# Patient Record
Sex: MALE | Race: AMERICAN INDIAN OR ALASKA NATIVE | URBAN - NONMETROPOLITAN AREA
[De-identification: names, ages, dates, MRNs, and addresses within clinical notes are randomized per-mention and may not be internally consistent; named-entity substitution may affect disease eponyms.]

---
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
## 2019-01-02 ENCOUNTER — HOSPITAL ENCOUNTER (OUTPATIENT)
Dept: MRI IMAGING | Facility: OTHER | Age: 19
Discharge: HOME | End: 2019-01-02
Attending: NURSE PRACTITIONER | Admitting: FAMILY MEDICINE
Payer: COMMERCIAL

## 2019-01-02 DIAGNOSIS — M25.362 INSTABILITY OF KNEE JOINT, LEFT: ICD-10-CM

## 2019-01-02 DIAGNOSIS — M25.40 TRAUMATIC JOINT EFFUSION: ICD-10-CM

## 2019-01-02 PROCEDURE — 73721 MRI JNT OF LWR EXTRE W/O DYE: CPT | Mod: LT

## 2019-01-04 ENCOUNTER — TRANSFERRED RECORDS (OUTPATIENT)
Dept: HEALTH INFORMATION MANAGEMENT | Facility: CLINIC | Age: 19
End: 2019-01-04

## 2019-01-04 ENCOUNTER — MEDICAL CORRESPONDENCE (OUTPATIENT)
Dept: HEALTH INFORMATION MANAGEMENT | Facility: CLINIC | Age: 19
End: 2019-01-04

## 2019-01-10 ENCOUNTER — OFFICE VISIT (OUTPATIENT)
Dept: ORTHOPEDICS | Facility: CLINIC | Age: 19
End: 2019-01-10
Payer: COMMERCIAL

## 2019-01-10 VITALS
DIASTOLIC BLOOD PRESSURE: 74 MMHG | BODY MASS INDEX: 27.24 KG/M2 | HEIGHT: 71 IN | WEIGHT: 194.6 LBS | SYSTOLIC BLOOD PRESSURE: 137 MMHG | HEART RATE: 64 BPM | OXYGEN SATURATION: 98 %

## 2019-01-10 DIAGNOSIS — G89.29 CHRONIC PAIN OF LEFT KNEE: Primary | ICD-10-CM

## 2019-01-10 DIAGNOSIS — M25.562 CHRONIC PAIN OF LEFT KNEE: Primary | ICD-10-CM

## 2019-01-10 PROCEDURE — 99214 OFFICE O/P EST MOD 30 MIN: CPT | Mod: 24 | Performed by: ORTHOPAEDIC SURGERY

## 2019-01-10 ASSESSMENT — MIFFLIN-ST. JEOR: SCORE: 1919.83

## 2019-01-10 ASSESSMENT — PAIN SCALES - GENERAL: PAINLEVEL: NO PAIN (0)

## 2019-01-10 NOTE — PROGRESS NOTES
Patient seen and examined with the resident.     Assesment: Left ACL tear, Left lateral meniscus tear    Plan: offered btb acl reconstruction, lateral meniscus repair vs. Meniscectomy    I discussed with the patient the risks, benefits, complications and techniques of surgery as well as the natural history of ACL tears, meniscus tearsand the alternative treatment options.    The risks include, but are not limited to the risk of death and risk of a myocardial infarction, risk of bleeding and a risk of infection, risk of nerve damage and a risk of muscle damage, stiffness, instability, continued pain or worsening pain and retear of acl, stiffness, need for future surgery, continued pain, failure to improve.    The patient was provided an opportunity to ask questions and these were answered.    I agree with history, physical and imaging as well as the assessment and plan as detailed by Dr. Daniels.

## 2019-01-10 NOTE — PATIENT INSTRUCTIONS
[facility]  [address] [place] [id number]  Phone [phone]  Fax [phone]    SURGICAL INFORMATION & INSTRUCTIONS  Dr. Sunny Guillen  Name of Surgery: Left knee ACL reconstruction and meniscus repair    Date of Surgery:     If you need to reschedule/schedule your surgery, please contact Emily, our surgery scheduler at [place], at [phone].    Arrival Time:     Time of Surgery:      Please arrive early so that we can prepare you for surgery. If you arrive later than your scheduled arrival time, your surgery may be cancelled.  Please note that scheduled times may change, but you will always be notified if there is a change.      Location of Surgery:     ? [facility]  [address] [place] [address]  5th floor check-in  Phone [phone]  Fax [phone]  [email or web address]    Medical Leave Forms    ? Please fax any medical leave forms from your employer/school to [phone]. It can take up to 3 business days to complete the forms. We will fax them back to the number listed on the forms, if you would like a copy, please let us know and we will mail a copy to you. Do not bring with on day of surgery as the forms may get lost.    Prior to surgery    ? Call your insurance company  Ask if you need pre-approval for your surgery.  If pre-approval is needed, please call our surgery scheduler for assistance with the pre-approval process.   If you do not have insurance, please let us know.     ? Schedule an appointment with a Primary Care Provider for a Pre-Op Physical.  This should be done within 30 days of surgery  If you do not have a primary care provider, you may call [facility] [place] at [phone], for an appointment.  Please have your office note and any labs or tests faxed to the facility where you are having surgery. Please be sure to request a copy of your pre-op physical and bring it with you on  the day of surgery.      Tell your provider if you have any of the following:   - IF you have a pacemaker or an ICD (implanted cardiac defibrillator). If you do, please bring the ID card with you on the day of surgery  - IF you're a smoker. People who smoke have a higher risk of infection after surgery. Ask your provider how you can quit smoking.  - If you have diabetes, work with your provider to control your blood sugar. If its not well-controlled, we may need to delay surgery (or you may have problems healing afterward).  - If your surgeon asks you to see your dentist: You'll need to complete any dental work before surgery. Your dentist must send a letter to your surgery center saying it's ok to do the surgery.    ? Pre-Op Phone Call  You will receive a pre-op phone call 1-3 days before surgery to review your eating and drinking restrictions, review medications, and confirm surgery times.      ? 7-10 days BEFORE surgery  ? Stop taking aspirin, Plavix or aspirin products 10 days before surgery or as directed by your doctor.  ? Stop taking non-steroidal anti-inflammatory medications (naproxen/Aleve, ibuprofen/Advil/Motrin, celecoxib/Celebrex, meloxicam/Mobic) 1 week before surgery or as directed by your surgeon.  This will reduce the risk of bleeding during surgery.  ? Stop taking fish oil (Omega-3-fatty acid) 1 week before surgery.  ? It is OK to take acetaminophen (Tylenol) up until 2 hours prior to surgery.  ? Take prescription medications as directed by your doctor. Discuss which medications to take or hold prior to your surgery, with your primary care doctor.  ? If you have diabetes, ask your primary care doctor or endocrinologist how you should take your medication(s).    ? 24 hours BEFORE surgery  Stop drinking alcohol (beer, wine, liquor) at least 24-hours before and after your surgery.     ? Evening BEFORE surgery  - You may eat a normal meal the night before surgery, but eat nothing after midnight.      - Take a shower - to help wash away bacteria (germs) from your skin.  It s normal to have bacteria on your skin and skin protects us from these germs.  When you have surgery, we cut the skin.  Sometimes germs get into the cuts and cause infection (illness caused by germs).  By following the showering instructions and using the special soap, you will lower the number of germs on your skin.  This decreases your chance of an infection.    - Buy or get 8 ounces of antiseptic surgical soap called 4% CHG.  Common brands of this soap are Hibiclens and Exidine.    - You can find it this soap at your local pharmacy, clinic or retail store.  If you have trouble finding it, ask your pharmacist to help you find the right substitute.    - Do not shave within 12 inches of your incision (surgical cut) area for at least 3 days before surgery.  Shaving can make small cuts in the skin. This puts you at a higher risk of infection.    Items you will need for each shower:   - Newly washed towel  - 4 ounces of one of the recommended soaps    Follow these instructions, the evening before surgery  - Wash your hair and body with your regular shampoo and soap. Make sure you rinse the shampoo and soap from your hair and body.  - Using clean hands, apply about 2 ounces of soap gently on your skin from the neck to your toes. Use on your groin area last. Do not use this soap on your face or head. If you get any soap in your eyes, ears or mouth, rinse right away.  - Once the soap has been on your skin for at least 1 minute, rinse off completely and repeat washing with the surgical soap one more time.  - Rinse well and dry off using a clean towel.  If you feel any tingling, itching or other irritation, rinse right away. It is normal to feel some coolness on the skin after using the antiseptic soap. Your skin may feel a bit dry after the shower, but do not use any lotions, creams or moisturizers. Do not use hair spray or other products in your  hair.  - Dress in freshly washed clothes or pajamas. Use fresh pillowcases and sheets on your bed.    ? Day of Surgery  - You may drink clear liquids from midnight up to 2 hours before surgery or as directed by your surgeon.  Clear liquids include: Water, Pedialyte, Gatorade, apple juice and liquids you can read through. Please avoid liquids that are red or orange in color.   - Do NOT drink: milk, liquids that have pulp, orange juice, and lemonade or tomato juice.   - Do NOT chew gum, chew tobacco or suck on hard candy.    - Take another shower          Follow these instructions:      - Wash your hair and body with your regular shampoo and soap. Make sure you rinse the shampoo and soap from your hair and body.  - Using clean hands, apply about 2 ounces of soap gently on your skin from the neck to your toes. Use on your groin area last. Do not use this soap on your face or head. If you get any soap in your eyes, ears or mouth, rinse right away.  - Once the soap has been on your skin for at least 1 minute, rinse off completely and repeat washing with the surgical soap one more time.  - Rinse well and dry off using a clean towel.  If you feel any tingling, itching or other irritation, rinse right away. It is normal to feel some coolness on the skin after using the antiseptic soap. Your skin may feel a bit dry after the shower, but do not use any lotions, creams or moisturizers. Do not use hair spray or other products in your hair.  - Dress in freshly washed clothes.  - Do not wear deodorant, cologne, lotion, makeup, nail polish or jewelry to surgery.    ? If there is any change in your health PRIOR to your surgery, please contact your surgeon's office.  Such as a fever, body aches, fatigue, any flu-like symptoms, rash, or any new injury to related body part.    ? Arrange for someone to drive you home after surgery.    will need to be a responsible adult (18 years or older) that will provide transportation to and  from surgery and stay in the waiting room during your surgery. You may not drive yourself or take public transportation to and from surgery.    ? Arrange for someone to stay with you for 24 hours after you go home.   This person must be a responsible adult (18 years or older).    ? Bring these items to the hospital/surgery center:   ? Insurance card(s)  ? Money for parking and co-pays, if needed  ? A list of all the medications you take, including vitamins, minerals, herbs and over the counter medications.    ? A copy of your Advance Health Care Directive, if you have one.  This tells us what treatment(s) you would or would not want, and who would make health care decisions, if you could no longer speak for yourself.    ? A case for glasses, contact lenses, hearing aids or dentures.   ? Your inhaler or CPAP machine, if you use these at home    ? Leave extra cash, jewelry and other valuables at home.       ? Other information:   Sleep Apnea: Let your nurse know if you have a history of sleep apnea, only if you are having surgery at the [facility].    When you arrive for surgery  When you get to the surgery center/hospital, you will:  - Check in. If you are under age 18, you must be with a parent or legal guardian.  - Sign consent forms, if you haven t already. These forms state that you know the risks and benefits of surgery. When you sign the forms, you give us permission to do the surgery. Do not sign them unless you understand what will happen during and after your surgery. If you have any questions about your surgery, ask to speak with your doctor before you sign the forms. If you don t understand the answers, ask again.  - Receive a copy of the Patient s Bill of Rights. If you do not receive a copy, please ask for one.  - Change into hospital clothes. Your belongings will be placed in a bag. We will return them to you after surgery.  - Meet with the anesthesia provider. He or she will tell you  what kind of anesthesia (medicine) will be used to keep you comfortable during surgery.  - Remember: it s okay to remind doctors and nurses to wash their hands before touching you.  - In most cases, your surgeon will use a marker to write his or her initials on the surgery site. This ensures that the exact site is operated on.  - For safety reasons, we will ask you the same questions many times. For example, we may ask your name and birth date over and over again.  - Friends and family can stay with you until it s time for surgery. While you re in surgery, they will be in the waiting area. Please note that cell phones are not allowed in some patient care areas.  - If you have questions about what will happen in the operating room, talk to your care team.  - You will meet with an anesthesiologist, before your surgery.  He or she may reference types of anesthesia commonly used for surgeries:   o General:  This involves the use of an IV for injection of medication and anesthesia. You are put into a sleep and have a breathing tube to assist you with breathing.  o Sedation:  You are asleep, but not so deply that you need a breathing tube.   o Local or Regional: a nerve is injected to numb the surgical area.  o Spinal: you are numbed from the waist down with medicine injected into your back.  o Femoral Nerve Block:  Anesthesia injected into the groin of leg which you are having surgery on.      After surgery  We will move you to a recovery room, where we will watch you closely. If you have any pain or discomfort, tell your nurse. He or she will try to make you comfortable.    - If you are staying overnight, we will move you to your hospital room after you are awake.  - If you are going home, we will move you to another room. Friends and family may be able to join you. The length of time you spend in recovery depend on the type of medicine you received, your medical condition, the type of surgery you had, or your response  to the anesthesia given during your procedure.  - When you are discharged from the recovery room, the nurses will review instructions with you and your caregiver.  - Please wash your hands every time you touch the wound or change bandages or dressings.  - Do not submerge the wound in water for 2-3 weeks after surgery.  You may not use a bathtub or hot tub until the wound is closed. Any open area can be a source of incoming bacteria, so it is better to be on the safe side and avoid water submersion until your wound is fully healed.  Keep all dressings clean and dry.   - If you had surgery on your arm or leg, elevate it as much as possible to help reduce swelling.  - You may put ice on the surgical area for comfort, keeping ice on area for up to 20 minutes then off for 40 minutes.  You may do this the first few days after surgery to help reduce pain and swelling.   - Many surgical wounds will have small white strips of tape on them called steri-strips. These are to help support the incision and surrounding skin. Do not remove these. The edges will curl and fall off on their own, typically within 7-10 days with normal showering and hygiene.   - Drink at least 8-10 glasses of liquid each day to help your body heal.  - Keep your lungs clear by coughing and taking deep breaths every couple hours.  This is especially important the first 48 hours after surgery.    - Notify your doctor if you have any of the following:   o Fever of 101 F or higher  o Numbness and/or tingling  o Increased pain, redness or swelling  o Drainage from wound  o Prolonged or uncontrolled bleeding  o Difficulty with movement    ? Physical Therapy  You will start physical therapy 3-5 days after surgery. Please set up an appointment before your surgery with a physical therapist of your choice. Many places are booked out 1-2 weeks, so be sure to reach out as soon as you can. If you have questions or are needing a physical therapy order faxed, please  contact our clinic.     Follow-up Appointments, in Clinic  If you don't already have an appointment scheduled, please call to set up an appointment at [phone].      ? Post-Op appointments with provider (1 and 6 weeks post op)    Dealing with pain  A nurse will check your comfort level often during your stay. He or she will work with you to manage your pain.  It s expected that you will have pain after surgery.  Our goal is to reduce or minimize pain by:   - Remember pain is real. There are many ways to control pain. We can help you decide what works best for you.  - Ask for pain medicine when you need it.  Don t try to  tough it out --this can make you feel worse. Always take your medicine as ordered.  - Medicine doesn t work the same for everyone. If your medicine isn t working, tell your nurse. There may be other medicines or treatments we can try.  - Medication Refills.  If you need refills on your pain medication, please call the clinic as soon as possible.  It may take 72-business hours to obtain a refill.  Refills must be picked up at check-in 2, [facility] or mailed to a pharmacy of your choice.    - It is expected that you will wean off the pain medications in a timely manner.   - Constipation is a common side effect of pain medication, decreased activity and anesthesia from surgery.  Take a stool softener as prescribed by your doctor at the time of discharge.  You may also use over the counter medications as needed.  Be sure to increase your fiber (fruits and vegetables) and your water intake.      Please call the clinic at [phone], if you experience any problems or have questions.  If you are having an emergency, always call 221 or seek immediate evaluation at the Emergency Room.    Thank you for selecting the [facility] for your care!  ---------------------------------------------            Thanks for coming today.  [facility]  [address] [place] [address]    To schedule future appointments in Ortho Clinic, you may call [phone].    To schedule ordered imaging by your Provider: Call [facility] at [phone]    [employer] available online at:   [email or web address]    Please call if any further questions or concerns [phone] and ask for the Orthopedic Department. Clinic hours 8 am to 5 pm.    Return to clinic if symptoms worsen.

## 2019-01-10 NOTE — LETTER
1/10/2019         RE: Rene Domínguez  [id number] [address] [address]        Dear Colleague,    Thank you for referring your patient, Rene Domínguez, to the [facility] [[email or web address] Please see a copy of my visit note below.    CHIEF CONCERN: left knee injury    HISTORY:   The patient is a 18 year old male who is here for evaluation of a left knee injury. On 12/26/2018 he sustained a twisting-type injury to his left knee that was non contact. He had immediate pain and swelling, and presented to an ED. He had normal XRs, was placed in a knee immobilizer and given crutches. He was further evaluated at [facility] where an MRI was obtained that demonstrated ACL and meniscal injury, he was referred on to us for surgical evaluation.    He reports the pain and swelling has been improving, he's not taking pain medications. He has been WBAT without crutches. He has felt that his knee is unstable for him while walking without a brace. He hasn't been working with PT yet.    He did have a RIGHT MCL sprain in the past treated non op, he denies prior injuries or issues with his LEFT knee.    PAST MEDICAL HISTORY: (Reviewed with the patient and in the [facility] medical record)  1. No known medical problems    PAST SURGICAL HISTORY: (Reviewed with the patient and in the [facility] medical record)  1. Umbilical hernia repair  2. Orchectomy    MEDICATIONS: (Reviewed with the patient and in the [facility] medical record)    Notable medications include: none    ALLERGIES: (Reviewed with the patient and in the [facility] medical record)  1. amoxicillin    SOCIAL HISTORY: (Reviewed with the patient and in the medical record)  --Tobacco: nonsmoker  --Occupation: in school at [employer]  --Avocation/Sport: basketball, hunting, fishing, football    FAMILY HISTORY: (Reviewed with the patient and in the medical record)  -- No family history of bleeding, clotting, or difficulty with anesthesia    REVIEW OF SYSTEMS: (Reviewed  with the patient and on the health intake form)  -- A comprehensive 10 point review of systems was conducted and is negative except as noted in the HPI    EXAM:     General: Awake, Alert and Oriented, No acute Distress. Articulate and Interactive    There is no height or weight on file to calculate BMI.    Left Lower extremity :    Skin is Warm and Well perfused, no suggestion of infection    No previous incisions    Moderate effusion    Mild TTP on lateral joint line    ROM 5-95    Lachmans 2B, significant guarding with pivot shift testing    EHL/FHL/TA/GS 5/5    Sensation intact L3-S1    2+ Dorsalis Pedis Pulse     IMAGING:    Plain Radiographs: XR left knee with no osseous abnormalities    MRI left knee demonstrates an ACL tear and lateral meniscus bucket handle tear    ASSESSMENT:  1. Left knee ACL tear and lateral meniscus bucket handle tear    PLAN:  1. We discussed treatment options and our recommendation for surgery, and the patient was in agreement. We will plan on the following: left knee arthroscopy, ACL reconstruction with BTB autograft, and lateral meniscus repair.      Vee Daniels MD  Patient seen and evaluated with Dr. Guillen    Patient seen and examined with the resident.     Assesment: Left ACL tear, Left lateral meniscus tear    Plan: offered btb acl reconstruction, lateral meniscus repair vs. Meniscectomy    I discussed with the patient the risks, benefits, complications and techniques of surgery as well as the natural history of ACL tears, meniscus tearsand the alternative treatment options.    The risks include, but are not limited to the risk of death and risk of a myocardial infarction, risk of bleeding and a risk of infection, risk of nerve damage and a risk of muscle damage, stiffness, instability, continued pain or worsening pain and retear of acl, stiffness, need for future surgery, continued pain, failure to improve.    The patient was provided an opportunity to ask questions and  these were answered.    I agree with history, physical and imaging as well as the assessment and plan as detailed by Dr. Daniels.       Again, thank you for allowing me to participate in the care of your patient.        Sincerely,        Sunny Guillen MD

## 2019-01-10 NOTE — NURSING NOTE
"Rene Domínguez's chief complaint for this visit includes:  Chief Complaint   Patient presents with     Consult For     left knee     PCP: No Ref-Primary, Physician    Referring Provider:  Sunny León NP  [facility]  [address] [place] [address]    /74   Pulse 64   Ht 1.803 m (5' 11\")   Wt 88.3 kg (194 lb 9.6 oz)   SpO2 98%   BMI 27.14 kg/m    No Pain (0)     Do you need any medication refills at today's visit? no      "

## 2019-01-10 NOTE — PROGRESS NOTES
CHIEF CONCERN: left knee injury    HISTORY:   The patient is a 18 year old male who is here for evaluation of a left knee injury. On 12/26/2018 he sustained a twisting-type injury to his left knee that was non contact. He had immediate pain and swelling, and presented to an ED. He had normal XRs, was placed in a knee immobilizer and given crutches. He was further evaluated at [facility] where an MRI was obtained that demonstrated ACL and meniscal injury, he was referred on to us for surgical evaluation.    He reports the pain and swelling has been improving, he's not taking pain medications. He has been WBAT without crutches. He has felt that his knee is unstable for him while walking without a brace. He hasn't been working with PT yet.    He did have a RIGHT MCL sprain in the past treated non op, he denies prior injuries or issues with his LEFT knee.    PAST MEDICAL HISTORY: (Reviewed with the patient and in the [facility] medical record)  1. No known medical problems    PAST SURGICAL HISTORY: (Reviewed with the patient and in the [facility] medical record)  1. Umbilical hernia repair  2. Orchectomy    MEDICATIONS: (Reviewed with the patient and in the [facility] medical record)    Notable medications include: none    ALLERGIES: (Reviewed with the patient and in the [facility] medical record)  1. amoxicillin    SOCIAL HISTORY: (Reviewed with the patient and in the medical record)  --Tobacco: nonsmoker  --Occupation: in school at [employer]  --Avocation/Sport: basketball, hunting, fishing, football    FAMILY HISTORY: (Reviewed with the patient and in the medical record)  -- No family history of bleeding, clotting, or difficulty with anesthesia    REVIEW OF SYSTEMS: (Reviewed with the patient and on the health intake form)  -- A comprehensive 10 point review of systems was conducted and is negative except as noted in the HPI    EXAM:     General: Awake, Alert and Oriented, No acute Distress. Articulate and Interactive    There  is no height or weight on file to calculate BMI.    Left Lower extremity :    Skin is Warm and Well perfused, no suggestion of infection    No previous incisions    Moderate effusion    Mild TTP on lateral joint line    ROM 5-95    Lachmans 2B, significant guarding with pivot shift testing    EHL/FHL/TA/GS 5/5    Sensation intact L3-S1    2+ Dorsalis Pedis Pulse     IMAGING:    Plain Radiographs: XR left knee with no osseous abnormalities    MRI left knee demonstrates an ACL tear and lateral meniscus bucket handle tear    ASSESSMENT:  1. Left knee ACL tear and lateral meniscus bucket handle tear    PLAN:  1. We discussed treatment options and our recommendation for surgery, and the patient was in agreement. We will plan on the following: left knee arthroscopy, ACL reconstruction with BTB autograft, and lateral meniscus repair.      Vee Daniels MD  Patient seen and evaluated with Dr. Guillen

## 2019-01-14 ENCOUNTER — OFFICE VISIT (OUTPATIENT)
Dept: FAMILY MEDICINE | Facility: OTHER | Age: 19
End: 2019-01-14
Attending: FAMILY MEDICINE
Payer: COMMERCIAL

## 2019-01-14 ENCOUNTER — ANESTHESIA EVENT (OUTPATIENT)
Dept: SURGERY | Facility: AMBULATORY SURGERY CENTER | Age: 19
End: 2019-01-14

## 2019-01-14 VITALS — BODY MASS INDEX: 27.58 KG/M2 | HEIGHT: 71 IN | OXYGEN SATURATION: 98 % | WEIGHT: 197 LBS | HEART RATE: 53 BPM

## 2019-01-14 DIAGNOSIS — N44.00 TORSION OF TESTICLE: ICD-10-CM

## 2019-01-14 DIAGNOSIS — S83.252D BUCKET-HANDLE TEAR OF LATERAL MENISCUS OF LEFT KNEE AS CURRENT INJURY, SUBSEQUENT ENCOUNTER: ICD-10-CM

## 2019-01-14 DIAGNOSIS — S83.512D DISRUPTION OF ANTERIOR CRUCIATE LIGAMENT OF LEFT KNEE, SUBSEQUENT ENCOUNTER: ICD-10-CM

## 2019-01-14 DIAGNOSIS — Z01.818 PRE-OP EXAM: Primary | ICD-10-CM

## 2019-01-14 PROBLEM — S83.512A DISRUPTION OF ANTERIOR CRUCIATE LIGAMENT OF LEFT KNEE: Status: ACTIVE | Noted: 2019-01-14

## 2019-01-14 PROBLEM — S83.252A BUCKET-HANDLE TEAR OF LATERAL MENISCUS OF LEFT KNEE AS CURRENT INJURY: Status: ACTIVE | Noted: 2019-01-14

## 2019-01-14 LAB
ERYTHROCYTE [DISTWIDTH] IN BLOOD BY AUTOMATED COUNT: 12 % (ref 10–15)
HCT VFR BLD AUTO: 44.7 % (ref 40–53)
HGB BLD-MCNC: 15.7 G/DL (ref 13.3–17.7)
MCH RBC QN AUTO: 32.4 PG (ref 26.5–33)
MCHC RBC AUTO-ENTMCNC: 35.1 G/DL (ref 31.5–36.5)
MCV RBC AUTO: 92 FL (ref 78–100)
PLATELET # BLD AUTO: 191 10E9/L (ref 150–450)
RBC # BLD AUTO: 4.85 10E12/L (ref 4.4–5.9)
WBC # BLD AUTO: 6.5 10E9/L (ref 4–11)

## 2019-01-14 PROCEDURE — 85027 COMPLETE CBC AUTOMATED: CPT | Performed by: FAMILY MEDICINE

## 2019-01-14 PROCEDURE — 36415 COLL VENOUS BLD VENIPUNCTURE: CPT | Performed by: FAMILY MEDICINE

## 2019-01-14 PROCEDURE — 99214 OFFICE O/P EST MOD 30 MIN: CPT | Performed by: FAMILY MEDICINE

## 2019-01-14 ASSESSMENT — ANXIETY QUESTIONNAIRES
6. BECOMING EASILY ANNOYED OR IRRITABLE: NOT AT ALL
GAD7 TOTAL SCORE: 0
1. FEELING NERVOUS, ANXIOUS, OR ON EDGE: NOT AT ALL
5. BEING SO RESTLESS THAT IT IS HARD TO SIT STILL: NOT AT ALL
3. WORRYING TOO MUCH ABOUT DIFFERENT THINGS: NOT AT ALL
2. NOT BEING ABLE TO STOP OR CONTROL WORRYING: NOT AT ALL
7. FEELING AFRAID AS IF SOMETHING AWFUL MIGHT HAPPEN: NOT AT ALL

## 2019-01-14 ASSESSMENT — PAIN SCALES - GENERAL: PAINLEVEL: NO PAIN (0)

## 2019-01-14 ASSESSMENT — PATIENT HEALTH QUESTIONNAIRE - PHQ9
5. POOR APPETITE OR OVEREATING: NOT AT ALL
SUM OF ALL RESPONSES TO PHQ QUESTIONS 1-9: 0

## 2019-01-14 ASSESSMENT — LIFESTYLE VARIABLES: TOBACCO_USE: 0

## 2019-01-14 ASSESSMENT — MIFFLIN-ST. JEOR: SCORE: 1930.72

## 2019-01-14 NOTE — PROGRESS NOTES
"----------------- PREOPERATIVE EXAM ------------------  1/14/2019    SUBJECTIVE:  Rene Domínguez is a 18 year old male here for preoperative optimization.    I was asked to see Rene Domínguez by Dr. Guillen for preoperative     Date of Surgery: 01/15  Type of Surgery: left knee arthroscopy    Hospital:   [facility]    HPI: Patient arrives here for preop.  He is scheduled to undergo surgery to his left knee tomorrow January 15.  States December 26 he injured it while playing basketball.  He had come down twisted it.  Reports pain.  Difficulty moving.  Swelling.  He arrives here with a splint    Nursing Notes:   Gisel Bey LPN  1/14/2019  9:23 AM  Signed  Date of Surgery: 01/15/19   Type of Surgery: left knee meniscus  Surgeon:   Hospital:  [facility]   Fax: [phone]      Fever/Chills or other infectious symptoms in past month:no  >10lb weight loss in past two months: no  O2 SAT: 98    Health Care Directive/Code status:  no  Hx of blood transfusions:   no  Td up to date:  yes  History of VRE/MRSA:  no Date:     Preoperative Evaluation: Obstructive Sleep Apnea screening    S: Snore -  Do you snore loudly? (louder than talking or loud enough to be heard through closed doors)no  T: Tired - Do you often feel tired, fatigued, or sleepy during the daytime?no  O: Observed - Has anyone ever observed you stop breathing during your sleep?no  P: Pressure - Do you have or are you being treated for high blood pressure?no  B: BMI - BMI greater than 35kg/m2?no  A: Age - Age over 50 years old?no  N: Neck - Neck circumference greater than 40 cm?no  G: Gender - Gender: Male?yes    Total number of \"YES\" responses:  1    Scoring: Low risk of MARGARITA 0-2  At Risk of MARGARITA: >3 High Risk of MARGARITA: 5-8    Gisel Bey 1/14/2019 8:58 AM  Medication Reconciliation: complete.    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     Priority: Medium     Vitiligo 04/08/2010     Priority: Medium       Past Medical History:   Diagnosis Date     Concussion with loss of consciousness     x1 reports (\"being out for 3 min\".)     Other injury of unspecified body region     fx of forefoot     Other specified epidermal thickening     No Comments Provided     Undescended testicle     No Comments Provided     Vitiligo     has had derm consult       Past Surgical History:   Procedure Laterality Date     HERNIORRHAPHY UMBILICAL N/A 11/6/2018    Procedure: Umbilical Exploration, Excision of Urachal Cyst;  Surgeon: Rj Romero MD;  Location: [facility] OR     testicular surgery  2012    [facility]     wisdom teeth         Family History   Problem Relation Age of Onset     Hypertension Father         Hypertension     Hypertension Maternal Grandfather         Hypertension     Prostate Cancer Maternal Grandfather         Cancer-prostate     Thyroid Disease Maternal Grandmother         Thyroid Disease       Social History     Tobacco Use     Smoking status: Never Smoker     Smokeless tobacco: Never Used   Substance Use Topics     Alcohol use: No     Drug use: No     Comment: Drug use: No       No current outpatient medications on file.       Allergies:  Allergies   Allergen Reactions     Amoxicillin Rash and Hives     As a child       ROS:    Surgical:  patient denies previous complications from prior surgeries including but not limited to prolonged bleeding, anesthesia complications, dysrhythmias, surgical wound infections, or prolonged hospital stay.    Denies family hx of bleeding tendencies, anesthesia " "complications, or other problems with surgery.  Review of systems is positive only for knee and calf pain related to his injury.  For complete details see scanned reports       -------------------------------------------------------------    PHYSICAL EXAM:  Pulse 53   Ht 1.803 m (5' 11\")   Wt 89.4 kg (197 lb)   SpO2 98%   BMI 27.48 kg/m      EXAM:  General Appearance: Pleasant, alert, appropriate appearance for age. No acute distress  Head Exam: Normal. Normocephalic, atraumatic.  Eyes: PERRL, EOMI  Ears: Normal TM's bilaterally. Normal auditory canals and external ears.   OroPharynx: Normal buccal mucosa. Normal pharynx.  Neck: Supple, no masses or nodes, no lymphadenopathy.  No thyromegaly.  Lungs: Normal chest wall and respirations. Clear to auscultation, no wheezes or crackles.  Cardiovascular: Regular rate and rhythm. S1, S2, no murmurs.  Gastrointestinal: Soft, nontender, no abnormal masses or organomegaly. BS normal   Musculoskeletal:  Brace on left knee  Skin: no concerning or new rashes.  Neurologic Exam. No tremor.  Psychiatric Exam: Alert and oriented, appropriate affect.    -------------------    Results for orders placed or performed in visit on 01/14/19   CBC W PLT No Diff   Result Value Ref Range    WBC 6.5 4.0 - 11.0 10e9/L    RBC Count 4.85 4.4 - 5.9 10e12/L    Hemoglobin 15.7 13.3 - 17.7 g/dL    Hematocrit 44.7 40.0 - 53.0 %    MCV 92 78 - 100 fl    MCH 32.4 26.5 - 33.0 pg    MCHC 35.1 31.5 - 36.5 g/dL    RDW 12.0 10.0 - 15.0 %    Platelet Count 191 150 - 450 10e9/L       LABS  Results for orders placed or performed during the hospital encounter of 01/02/19   MR Knee Left w/o Contrast    Narrative    Exam: MR KNEE LEFT W/O CONTRAST    HISTORY:  Traumatic joint effusion; Instability of knee joint, left.     Technique: Axial, coronal and sagittal images were obtained with  combination of proton density and T2-weighted images.    Findings: There is disruption of anterior cruciate ligament. No " intact  fibers are seen. Posterior cruciate ligament is intact as are lateral  collateral ligaments and the extensor mechanism. Medial collateral  ligament is intact but there is prominent edema over the MCL.    The medial meniscus is intact.    Lateral meniscus is abnormal. There is a bucket-handle type tear of  the lateral meniscus with very small portion of the meniscus displaced  into the intercondylar notch region especially posteriorly.    There is marrow edema in the lateral tibial plateau and in the lateral  femoral condyle. This is consistent with bone bruises. Small bone  bruises also seen in the medial femoral condyle and along the  posterior medial inferior patella.    No full-thickness articular cartilage loss is seen. There is a  moderate-sized joint effusion. There is some fluid at the  musculotendinous junction of the popliteus with thickening of and some  increased signal intensity within the popliteus tendon. The tendon  itself is intact. There is some edema in the lateral gastrocnemius  muscle proximally.           Impression    Impression:   1. Anterior cruciate ligament disruption.  2. Bucket-handle variant tear lateral meniscus.  3. Multiple bone bruises.  4. Edema and fluid associated with the popliteus musculotendinous  junction with some edema in the lateral gastrocnemius muscle as well.    NABOR CAR MD       ASSESSEMENT AND PLAN:    (Z01.818) Pre-op exam  (primary encounter diagnosis)      (S83.512D) Disruption of anterior cruciate ligament of left knee,     (S83.252D) Bucket-handle tear of lateral meniscus of left knee as current injury, subsequent encounter      (N44.00) Torsion of testicle sp surg left hx of         PRE OP RECOMMENDATIONS:  Patient is on chronic pain medications no   Patient is on antiplatlet/anticoagulation medication tylenol  Other medications that need adjustment perioperatively none    Other:  Patient was advised to call our office and the surgical services  with any change in condition or new symptoms if they were to develop between today and their surgical date.  Especially any cardiopulmonary symptoms or symptoms concerning for an infection.    Brandon Peck 1/14/2019

## 2019-01-14 NOTE — ANESTHESIA PREPROCEDURE EVALUATION
"Anesthesia Pre-Procedure Evaluation    Patient: Rene Domínguez   MRN:     [id number] Gender:   male   Age:    18 year old :      2000        Preoperative Diagnosis: Anterior Cruciate Ligament Tear   Procedure(s):  Examination Under Anesthesia, Left Knee Anterior Cruciate Ligament Reconstruction, Bone Tendon Bone Autograft  Meniscus Surgery     Past Medical History:   Diagnosis Date     Concussion with loss of consciousness     x1 reports (\"being out for 3 min\".)     Other injury of unspecified body region     fx of forefoot     Other specified epidermal thickening     No Comments Provided     Undescended testicle     No Comments Provided     Vitiligo     has had derm consult      Past Surgical History:   Procedure Laterality Date     HERNIORRHAPHY UMBILICAL N/A 2018    Procedure: Umbilical Exploration, Excision of Urachal Cyst;  Surgeon: Rj Romero MD;  Location: [facility] OR     testicular surgery      [facility]     wisdom teeth            Anesthesia Evaluation     . Pt has had prior anesthetic. Type: General    No history of anesthetic complications          ROS/MED HX    ENT/Pulmonary:  - neg pulmonary ROS    (-) tobacco use   Neurologic:  - neg neurologic ROS     Cardiovascular:  - neg cardiovascular ROS       METS/Exercise Tolerance:  >4 METS   Hematologic:  - neg hematologic  ROS       Musculoskeletal:  - neg musculoskeletal ROS       GI/Hepatic:  - neg GI/hepatic ROS       Renal/Genitourinary:  - ROS Renal section negative       Endo:  - neg endo ROS       Psychiatric:  - neg psychiatric ROS       Infectious Disease:  - neg infectious disease ROS       Malignancy:      - no malignancy   Other:    - neg other ROS                     PHYSICAL EXAM:   Mental Status/Neuro: A/A/O   Airway: Facies: Feasible  Mallampati: II  Mouth/Opening: Full  TM distance: > 6 cm  Neck ROM: Full   Respiratory: Auscultation: CTAB     Resp. Rate: Normal     Resp. Effort: Normal      CV: Rhythm: " "Regular  Rate: Age appropriate  Heart: Normal Sounds   Comments:      Dental: Normal                  Lab Results   Component Value Date    WBC 6.5 01/14/2019    HGB 15.7 01/14/2019    HCT 44.7 01/14/2019     01/14/2019       Preop Vitals  BP Readings from Last 3 Encounters:   01/10/19 137/74 (91 %/ 62 %)*   12/26/18 123/63 (50 %/ 14 %)*   11/06/18 113/71 (15 %/ 43 %)*     *BP percentiles are based on the August 2017 AAP Clinical Practice Guideline for boys    Pulse Readings from Last 3 Encounters:   01/14/19 53   01/10/19 64   11/06/18 67      Resp Readings from Last 3 Encounters:   11/06/18 16   10/18/18 14   04/21/17 16    SpO2 Readings from Last 3 Encounters:   01/14/19 98%   01/10/19 98%   12/26/18 100%      Temp Readings from Last 1 Encounters:   12/26/18 36.4  C (97.5  F) (Temporal)    Ht Readings from Last 1 Encounters:   01/14/19 1.803 m (5' 11\") (69 %)*     * Growth percentiles are based on CDC (Boys, 2-20 Years) data.      Wt Readings from Last 1 Encounters:   01/14/19 89.4 kg (197 lb) (91 %)*     * Growth percentiles are based on CDC (Boys, 2-20 Years) data.    Estimated body mass index is 27.48 kg/m  as calculated from the following:    Height as of 1/14/19: 1.803 m (5' 11\").    Weight as of 1/14/19: 89.4 kg (197 lb).     LDA:            Assessment:   ASA SCORE: 1    NPO Status: > 6 hours since completed [employer]   Documentation: H&P complete; Preop Testing complete; Consents complete   Proceeding: Proceed without further delay  Tobacco Use:  NO Active use of Tobacco/UNKNOWN Tobacco use status     Plan:   Anes. Type:  General; Regional     RA Details:  FOR POSTOP PAIN CONTROL; L; Exparel     RA-Location/Type: Nerve Block; Adductor canal   Pre-Induction: Midazolam IV; Acetaminophen PO   Induction:  IV (Standard)   Airway: LMA   Access/Monitoring: PIV   Maintenance: Balanced   Emergence: Procedure Site   Logistics: Same Day Surgery     Postop Pain/Sedation Strategy:  Standard-Options: Opioids " PRN     PONV Management:  Adult Risk Factors:, Non-Smoker, Postop Opioids  Prevention: Ondansetron; Dexamethasone; Propofol Infusion     CONSENT: Direct conversation   Plan and risks discussed with: Patient          Comments for Plan/Consent:  Left Adductor and Posterior knee capsule infiltration with exaparel off label                          Tree Bedoya MD

## 2019-01-14 NOTE — NURSING NOTE
"Date of Surgery: 01/15/19   Type of Surgery: left knee meniscus  Surgeon:   Hospital:  [facility]   Fax: [phone]      Fever/Chills or other infectious symptoms in past month:no  >10lb weight loss in past two months: no  O2 SAT: 98    Health Care Directive/Code status:  no  Hx of blood transfusions:   no  Td up to date:  yes  History of VRE/MRSA:  no Date:     Preoperative Evaluation: Obstructive Sleep Apnea screening    S: Snore -  Do you snore loudly? (louder than talking or loud enough to be heard through closed doors)no  T: Tired - Do you often feel tired, fatigued, or sleepy during the daytime?no  O: Observed - Has anyone ever observed you stop breathing during your sleep?no  P: Pressure - Do you have or are you being treated for high blood pressure?no  B: BMI - BMI greater than 35kg/m2?no  A: Age - Age over 50 years old?no  N: Neck - Neck circumference greater than 40 cm?no  G: Gender - Gender: Male?yes    Total number of \"YES\" responses:  1    Scoring: Low risk of MARGARITA 0-2  At Risk of MARGARITA: >3 High Risk of MARGARITA: 5-8    Gisel Bey 1/14/2019 8:58 AM  Medication Reconciliation: complete.    Gisel Bey LPN  1/14/2019 8:58 AM  "

## 2019-01-15 ENCOUNTER — HOSPITAL ENCOUNTER (OUTPATIENT)
Facility: AMBULATORY SURGERY CENTER | Age: 19
End: 2019-01-15
Attending: ORTHOPAEDIC SURGERY
Payer: COMMERCIAL

## 2019-01-15 ENCOUNTER — ANESTHESIA (OUTPATIENT)
Dept: SURGERY | Facility: AMBULATORY SURGERY CENTER | Age: 19
End: 2019-01-15

## 2019-01-15 VITALS
TEMPERATURE: 98 F | OXYGEN SATURATION: 98 % | SYSTOLIC BLOOD PRESSURE: 144 MMHG | RESPIRATION RATE: 18 BRPM | DIASTOLIC BLOOD PRESSURE: 87 MMHG | HEART RATE: 68 BPM

## 2019-01-15 DIAGNOSIS — S83.512D DISRUPTION OF ANTERIOR CRUCIATE LIGAMENT OF LEFT KNEE, SUBSEQUENT ENCOUNTER: Primary | ICD-10-CM

## 2019-01-15 DEVICE — IMP SCR ARTHREX CAN FULL THRD 08X25MM AR-1381T: Type: IMPLANTABLE DEVICE | Site: KNEE | Status: FUNCTIONAL

## 2019-01-15 DEVICE — IMP SCR ARTHREX CAN 07X20MM AR-1370E: Type: IMPLANTABLE DEVICE | Site: KNEE | Status: FUNCTIONAL

## 2019-01-15 RX ORDER — NALOXONE HYDROCHLORIDE 0.4 MG/ML
.1-.4 INJECTION, SOLUTION INTRAMUSCULAR; INTRAVENOUS; SUBCUTANEOUS
Status: DISCONTINUED | OUTPATIENT
Start: 2019-01-15 | End: 2019-01-16 | Stop reason: HOSPADM

## 2019-01-15 RX ORDER — GABAPENTIN 300 MG/1
300 CAPSULE ORAL ONCE
Status: COMPLETED | OUTPATIENT
Start: 2019-01-15 | End: 2019-01-15

## 2019-01-15 RX ORDER — AMOXICILLIN 250 MG
1-2 CAPSULE ORAL 2 TIMES DAILY
Qty: 24 TABLET | Refills: 0 | Status: SHIPPED | OUTPATIENT
Start: 2019-01-15 | End: 2019-02-28

## 2019-01-15 RX ORDER — PROPOFOL 10 MG/ML
INJECTION, EMULSION INTRAVENOUS CONTINUOUS PRN
Status: DISCONTINUED | OUTPATIENT
Start: 2019-01-15 | End: 2019-01-15

## 2019-01-15 RX ORDER — SODIUM CHLORIDE, SODIUM LACTATE, POTASSIUM CHLORIDE, CALCIUM CHLORIDE 600; 310; 30; 20 MG/100ML; MG/100ML; MG/100ML; MG/100ML
INJECTION, SOLUTION INTRAVENOUS CONTINUOUS
Status: DISCONTINUED | OUTPATIENT
Start: 2019-01-15 | End: 2019-01-16 | Stop reason: HOSPADM

## 2019-01-15 RX ORDER — FLUMAZENIL 0.1 MG/ML
0.2 INJECTION, SOLUTION INTRAVENOUS
Status: DISCONTINUED | OUTPATIENT
Start: 2019-01-15 | End: 2019-01-15 | Stop reason: HOSPADM

## 2019-01-15 RX ORDER — ACETAMINOPHEN 325 MG/1
650 TABLET ORAL EVERY 4 HOURS
Qty: 120 TABLET | Refills: 0 | Status: SHIPPED | OUTPATIENT
Start: 2019-01-15

## 2019-01-15 RX ORDER — SODIUM CHLORIDE, SODIUM LACTATE, POTASSIUM CHLORIDE, CALCIUM CHLORIDE 600; 310; 30; 20 MG/100ML; MG/100ML; MG/100ML; MG/100ML
INJECTION, SOLUTION INTRAVENOUS CONTINUOUS PRN
Status: DISCONTINUED | OUTPATIENT
Start: 2019-01-15 | End: 2019-01-15

## 2019-01-15 RX ORDER — ONDANSETRON 4 MG/1
4-8 TABLET, ORALLY DISINTEGRATING ORAL EVERY 8 HOURS PRN
Qty: 4 TABLET | Refills: 0 | Status: SHIPPED | OUTPATIENT
Start: 2019-01-15 | End: 2019-02-28

## 2019-01-15 RX ORDER — ACETAMINOPHEN 325 MG/1
650 TABLET ORAL
Status: DISCONTINUED | OUTPATIENT
Start: 2019-01-15 | End: 2019-01-16 | Stop reason: HOSPADM

## 2019-01-15 RX ORDER — FENTANYL CITRATE 50 UG/ML
25-50 INJECTION, SOLUTION INTRAMUSCULAR; INTRAVENOUS
Status: DISCONTINUED | OUTPATIENT
Start: 2019-01-15 | End: 2019-01-16 | Stop reason: HOSPADM

## 2019-01-15 RX ORDER — NALOXONE HYDROCHLORIDE 0.4 MG/ML
.1-.4 INJECTION, SOLUTION INTRAMUSCULAR; INTRAVENOUS; SUBCUTANEOUS
Status: DISCONTINUED | OUTPATIENT
Start: 2019-01-15 | End: 2019-01-15 | Stop reason: HOSPADM

## 2019-01-15 RX ORDER — LIDOCAINE 40 MG/G
CREAM TOPICAL
Status: DISCONTINUED | OUTPATIENT
Start: 2019-01-15 | End: 2019-01-15 | Stop reason: HOSPADM

## 2019-01-15 RX ORDER — CEFAZOLIN SODIUM 1 G/50ML
1 SOLUTION INTRAVENOUS SEE ADMIN INSTRUCTIONS
Status: DISCONTINUED | OUTPATIENT
Start: 2019-01-15 | End: 2019-01-15

## 2019-01-15 RX ORDER — ONDANSETRON 4 MG/1
4 TABLET, ORALLY DISINTEGRATING ORAL EVERY 30 MIN PRN
Status: DISCONTINUED | OUTPATIENT
Start: 2019-01-15 | End: 2019-01-16 | Stop reason: HOSPADM

## 2019-01-15 RX ORDER — FENTANYL CITRATE 50 UG/ML
25-50 INJECTION, SOLUTION INTRAMUSCULAR; INTRAVENOUS
Status: DISCONTINUED | OUTPATIENT
Start: 2019-01-15 | End: 2019-01-15 | Stop reason: HOSPADM

## 2019-01-15 RX ORDER — OXYCODONE HYDROCHLORIDE 5 MG/1
5 TABLET ORAL EVERY 4 HOURS PRN
Status: DISCONTINUED | OUTPATIENT
Start: 2019-01-15 | End: 2019-01-16 | Stop reason: HOSPADM

## 2019-01-15 RX ORDER — DEXAMETHASONE SODIUM PHOSPHATE 4 MG/ML
INJECTION, SOLUTION INTRA-ARTICULAR; INTRALESIONAL; INTRAMUSCULAR; INTRAVENOUS; SOFT TISSUE PRN
Status: DISCONTINUED | OUTPATIENT
Start: 2019-01-15 | End: 2019-01-15

## 2019-01-15 RX ORDER — SODIUM CHLORIDE, SODIUM LACTATE, POTASSIUM CHLORIDE, CALCIUM CHLORIDE 600; 310; 30; 20 MG/100ML; MG/100ML; MG/100ML; MG/100ML
INJECTION, SOLUTION INTRAVENOUS CONTINUOUS
Status: DISCONTINUED | OUTPATIENT
Start: 2019-01-15 | End: 2019-01-15 | Stop reason: HOSPADM

## 2019-01-15 RX ORDER — ONDANSETRON 4 MG/1
4 TABLET, ORALLY DISINTEGRATING ORAL
Status: DISCONTINUED | OUTPATIENT
Start: 2019-01-15 | End: 2019-01-16 | Stop reason: HOSPADM

## 2019-01-15 RX ORDER — PROPOFOL 10 MG/ML
INJECTION, EMULSION INTRAVENOUS PRN
Status: DISCONTINUED | OUTPATIENT
Start: 2019-01-15 | End: 2019-01-15

## 2019-01-15 RX ORDER — OXYCODONE HYDROCHLORIDE 5 MG/1
5 TABLET ORAL
Status: DISCONTINUED | OUTPATIENT
Start: 2019-01-15 | End: 2019-01-16 | Stop reason: HOSPADM

## 2019-01-15 RX ORDER — MEPERIDINE HYDROCHLORIDE 25 MG/ML
12.5 INJECTION INTRAMUSCULAR; INTRAVENOUS; SUBCUTANEOUS
Status: DISCONTINUED | OUTPATIENT
Start: 2019-01-15 | End: 2019-01-16 | Stop reason: HOSPADM

## 2019-01-15 RX ORDER — CEFAZOLIN SODIUM 2 G/50ML
2 SOLUTION INTRAVENOUS
Status: DISCONTINUED | OUTPATIENT
Start: 2019-01-15 | End: 2019-01-15

## 2019-01-15 RX ORDER — CLINDAMYCIN PHOSPHATE 600 MG/50ML
600 INJECTION, SOLUTION INTRAVENOUS EVERY 8 HOURS
Status: DISCONTINUED | OUTPATIENT
Start: 2019-01-15 | End: 2019-01-16 | Stop reason: HOSPADM

## 2019-01-15 RX ORDER — BUPIVACAINE HYDROCHLORIDE AND EPINEPHRINE 2.5; 5 MG/ML; UG/ML
INJECTION, SOLUTION INFILTRATION; PERINEURAL PRN
Status: DISCONTINUED | OUTPATIENT
Start: 2019-01-15 | End: 2019-01-15 | Stop reason: HOSPADM

## 2019-01-15 RX ORDER — HYDROXYZINE HYDROCHLORIDE 25 MG/1
25 TABLET, FILM COATED ORAL
Status: DISCONTINUED | OUTPATIENT
Start: 2019-01-15 | End: 2019-01-16 | Stop reason: HOSPADM

## 2019-01-15 RX ORDER — ONDANSETRON 2 MG/ML
4 INJECTION INTRAMUSCULAR; INTRAVENOUS EVERY 30 MIN PRN
Status: DISCONTINUED | OUTPATIENT
Start: 2019-01-15 | End: 2019-01-16 | Stop reason: HOSPADM

## 2019-01-15 RX ORDER — ACETAMINOPHEN 325 MG/1
975 TABLET ORAL ONCE
Status: COMPLETED | OUTPATIENT
Start: 2019-01-15 | End: 2019-01-15

## 2019-01-15 RX ORDER — HYDROXYZINE HYDROCHLORIDE 25 MG/1
25 TABLET, FILM COATED ORAL 3 TIMES DAILY PRN
Qty: 30 TABLET | Refills: 1 | Status: SHIPPED | OUTPATIENT
Start: 2019-01-15 | End: 2019-02-28

## 2019-01-15 RX ORDER — ONDANSETRON 2 MG/ML
INJECTION INTRAMUSCULAR; INTRAVENOUS PRN
Status: DISCONTINUED | OUTPATIENT
Start: 2019-01-15 | End: 2019-01-15

## 2019-01-15 RX ORDER — OXYCODONE HYDROCHLORIDE 5 MG/1
5-10 TABLET ORAL EVERY 4 HOURS PRN
Qty: 30 TABLET | Refills: 0 | Status: SHIPPED | OUTPATIENT
Start: 2019-01-15 | End: 2019-02-28

## 2019-01-15 RX ADMIN — FENTANYL CITRATE 25 MCG: 50 INJECTION, SOLUTION INTRAMUSCULAR; INTRAVENOUS at 13:44

## 2019-01-15 RX ADMIN — CLINDAMYCIN PHOSPHATE 600 MG: 600 INJECTION, SOLUTION INTRAVENOUS at 10:46

## 2019-01-15 RX ADMIN — Medication 0.5 MG: at 13:00

## 2019-01-15 RX ADMIN — SODIUM CHLORIDE, SODIUM LACTATE, POTASSIUM CHLORIDE, CALCIUM CHLORIDE: 600; 310; 30; 20 INJECTION, SOLUTION INTRAVENOUS at 09:52

## 2019-01-15 RX ADMIN — BUPIVACAINE HYDROCHLORIDE AND EPINEPHRINE 20 ML: 2.5; 5 INJECTION, SOLUTION INFILTRATION; PERINEURAL at 10:28

## 2019-01-15 RX ADMIN — GABAPENTIN 300 MG: 300 CAPSULE ORAL at 09:52

## 2019-01-15 RX ADMIN — PROPOFOL 250 MG: 10 INJECTION, EMULSION INTRAVENOUS at 10:43

## 2019-01-15 RX ADMIN — SODIUM CHLORIDE, SODIUM LACTATE, POTASSIUM CHLORIDE, CALCIUM CHLORIDE: 600; 310; 30; 20 INJECTION, SOLUTION INTRAVENOUS at 10:34

## 2019-01-15 RX ADMIN — DEXAMETHASONE SODIUM PHOSPHATE 4 MG: 4 INJECTION, SOLUTION INTRA-ARTICULAR; INTRALESIONAL; INTRAMUSCULAR; INTRAVENOUS; SOFT TISSUE at 10:44

## 2019-01-15 RX ADMIN — PROPOFOL: 10 INJECTION, EMULSION INTRAVENOUS at 12:42

## 2019-01-15 RX ADMIN — PROPOFOL 150 MCG/KG/MIN: 10 INJECTION, EMULSION INTRAVENOUS at 11:21

## 2019-01-15 RX ADMIN — FENTANYL CITRATE 25 MCG: 50 INJECTION, SOLUTION INTRAMUSCULAR; INTRAVENOUS at 14:03

## 2019-01-15 RX ADMIN — PROPOFOL 200 MCG/KG/MIN: 10 INJECTION, EMULSION INTRAVENOUS at 10:44

## 2019-01-15 RX ADMIN — FENTANYL CITRATE 50 MCG: 50 INJECTION, SOLUTION INTRAMUSCULAR; INTRAVENOUS at 10:17

## 2019-01-15 RX ADMIN — ACETAMINOPHEN 975 MG: 325 TABLET ORAL at 09:52

## 2019-01-15 RX ADMIN — OXYCODONE HYDROCHLORIDE 5 MG: 5 TABLET ORAL at 13:50

## 2019-01-15 RX ADMIN — PROPOFOL: 10 INJECTION, EMULSION INTRAVENOUS at 12:01

## 2019-01-15 RX ADMIN — ONDANSETRON 4 MG: 2 INJECTION INTRAMUSCULAR; INTRAVENOUS at 10:44

## 2019-01-15 ASSESSMENT — ANXIETY QUESTIONNAIRES: GAD7 TOTAL SCORE: 0

## 2019-01-15 NOTE — ANESTHESIA PROCEDURE NOTES
Peripheral Nerve Block Procedure Note    Staff:     Anesthesiologist:  Aysha Edge MD    Resident/CRNA:  Tree Bedoya MD    Block performed by resident/CRNA in the presence of a teaching physician    Location: Pre-op  Procedure Start/Stop TImes:      1/15/2019 10:20 AM     1/15/2019 10:29 AM    patient identified, IV checked, site marked, risks and benefits discussed, informed consent, monitors and equipment checked, pre-op evaluation, at physician/surgeon's request and post-op pain management      Correct Patient: Yes      Correct Position: Yes      Correct Site: Yes      Correct Procedure: Yes      Correct Laterality:  Yes    Site Marked:  Yes  Procedure details:     Procedure:  Adductor canal and Other (IPECK )    ASA:  1    Diagnosis:  Perioperative Pain    Laterality:  Left    Position:  Supine    Sterile Prep: chloraprep, mask and sterile gloves      Local skin infiltration:  None    Needle:  Short bevel    Needle gauge:  21    Needle length (mm):  110    Ultrasound: Yes      Ultrasound used to identify targeted nerve, plexus, or vascular structure and placed a needle adjacent to it      Permanent Image entered into patiient's record      Blood Aspirated: No      Paresthesias:  No    Bleeding at site: No      Bolus via:  Needle    Infusion Method:  Single Shot    Complications:  None  Assessment/Narrative:     Injection made incrementally with aspirations every (mL):  5

## 2019-01-15 NOTE — OP NOTE
PREOPERATIVE DIAGNOSIS:   1. Left grade 3 rupture the anterior cruciate ligament  2. Left lateral meniscus tear    POSTOPERATIVE DIAGNOSIS:  1. Left grade 3 rupture of the anterior cruciate ligament  2. Left lateral meniscus tear    PROCEDURE:  1. Examination under anesthesia left knee  2. Left knee arthroscopy  3. ACL reconstruction bone tendon bone autograft  4. Left inside out lateral meniscus repair    DATE OF SURGERY: 1/15/2019    SURGEON: Sunny Guillen MD    ASSISTANT: None.     RESIDENT OR FELLOW: Vee Daniels MD    OPERATIVE INDICATIONS: Rene Domínguez is a pleasant 18 year old male who I saw through my orthopedic clinic with a history, physical, imaging consistent with ACL tear as well as a lateral meniscus tear we specifically discussed graft choice, the return to play protocol as well as the need for surgical reconstruction of the improvement of his long-term joint health.  I reviewed with the patient the risks, benefits, complications, techniques and alternatives to surgery.  We reviewed the expected course of recovery and the potential expected outcomes.  The patient understood both the risks and benefits and desired to proceed despite the risks.    OPERATIVE DETAILS: In the preoperative area the patient's informed consent was reviewed and they desired to proceed.  The left leg was marked and the patient was in agreement.  The patient was taken to the operating room where a timeout was performed and all parties were in agreement.  Preoperative antibiotics were given within 1 hour of the time of incision.  The patient was placed in the supine position and surrendered to LMA anesthesia.  No tourniquet was applied.  Egg crate was placed beneath the well leg and a side post was utilized.  The operative leg was prepped and draped in the usual sterile fashion.     Examination under anesthesia: Range of motion 0-135, 1 quadrant medial and 2 quadrant lateral translation of the patella, stable to  "varus and valgus stress testing, stable posterior drawer testing, 2+ anterior drawer testing, 2B Lachman, 1+ pivot shift    Graft harvest and preparation: A 5 cm midline incision was made and hemostasis was insured with the Bovie electrocautery.  The peritenon was opened longitudinally.  And we selected a section of tendon 10 mm in width.  We then marked on the tibial side 10 x 25 mm.  This was marked with a knife, defined with a Bovie and cut with an oscillating saw it was delivered into the wound with an osteotome.  The knee was brought to full extension where a proximal patellar retractor was placed.  We selected a section of bone 10 mm in width by 20 mm in length it was marked with a knife to find with the Bovie and cut with an oscillating saw.  No malleting was performed of the patella.    The graft was taken to the back table where it was fashioned to a 10 on the femur size 10 on the tibia.  2 drill holes were placed in each of the bone blocks and #2 fiber wires were placed through these holes.  A \"safety stitch\" was placed at the bone tendon interface on the tibial side.  Ink marking pen was used to mor the bone tendon interface in the femoral side.  The final graft dimensions showed a 20 mm bone block on the femoral side, a 25 mm bone block on the tibial side and the tibial plus tendon length of 70 mm.    Anterior medial and anterior lateral arthroscopic portals were created and a diagnostic arthroscopy was performed with the following findings: The medial patella facet, lateral patella facet, central ridge of the patella showed normal cartilage.  The trochlear cartilage was normal.  The medial femoral condyle showed normal cartilage and medial tibial plateau showed normal cartilage. The lateral femoral condyle showed normal cartilage and lateral tibial plateau showed normal. The Medial meniscus intact and Lateral Meniscus vertical tear of the posterior horn extending lateral to the popliteal sulcus with a " displaceable component to gentle probing.  There is also a superior unstable flap.  There was a grade 3 rupture of the anterior cruciate ligament with positive empty wall sign.  The PCL was intact.  There was no opening to varus and valgus stress testing in either the lateral or medial compartments, respectively.    A debridement of the nonfunctioning ACL was then performed until we could visualize the anatomic insertion sites of the ACL on both the femoral and the tibial origin.  Remnant preservation was pursued in the tibial side and the tibial tunnel was centered midway between the medial and lateral tibial spines in line with the posterior aspect of the anterior horn of the lateral meniscus.    A tip to tip guide was introduced through the medial portal.  Our osseous length measured 50mm to accommodate the tibial plus tendon length of our graft.  A 2.4 mm drill to pin was placed into the center of the anatomic insertion of the ACL on the tibial side.  A 10 mm reamer was then placed over this guidepin.  We dilated sequentially from 10-10.5 mm.  A plug was placed on the tibial side.    A spinal needle was then used to localize our accessory medial portal.  Once we are satisfied with its position a Gianni awl was used to select our location along the anatomic insertion of the ACL on the femoral footprint.  A Beath pin was placed.  The knee was hyperflexed.  The pin was advanced through the femur and a 10 mm low-profile reamer was used to ream a 25 mm femoral socket.  Bone debris was removed with motorized suction.  A passing suture was placed which was routed through the tibia.  Notching of the femoral tunnel was then performed.    At this time a 3 cm incision was made along the palpable fibular collateral ligament between the IT band the biceps femoris was carried down through the subcutaneous tissues meticulous hemostasis was insured the fascia was opened and a popliteal retractor was placed.  A series of 5,  2-O FiberWire sutures on meniscus needles were then placed in a vertical fashion above and vertical fashion below providing excellent compression.  A small meniscectomy the superior leaflet was performed of the unstable flap as well.  At the conclusion final arthroscopic images of the meniscus showed excellent stability to probing.  No instability.    The graft was brought up the patellar donor bone block was reduced through the tibial tunnel and dunked into the femur where it was fixed with a 7 x 20 mm metal interference screw.  Excellent purchase of the screw is noted.  The graft was cycled 20 times, maximal manual traction was applied and an 8 x 25 mm screw was placed in the tibial side with excellent purchase.  Lachman 0, no pivot shift, final arthroscopic images showed clearance along the root of the intercondylar notch and extension, clearance along the lateral wall and PCL in flexion.  Good tension to probing.    Copious irrigation was performed an a layered closure was initiated, sterile dressings were applied and the patient was transferred to the recovery room in stable condition with stable vital signs.    ESTIMATED BLOOD LOSS: 20 mL.    TOURNIQUET TIME: No tourniquet was placed.    COMPLICATIONS: None apparent.    DRAINS: None.    SPECIMENS: None.     POSTOPERATIVE PLAN:  Weightbearing as tolerated, wean from crutches when able  Hinged knee brace times 6 weeks  Weightbearing as tolerated with the brace on and locked, no weightbearing with a brace off for unlocked, this will continue to the 6-week mor  No running until 3 months  No sports until 6 months, return to game competition at 7-10 months  Shower on day 3  Start physical therapy day 3-5  Follow-up locally at 1 week follow-up with me in 6 weeks

## 2019-01-15 NOTE — DISCHARGE INSTRUCTIONS
Post Operative Instructions: Regional Anesthetic for Lower Extremity     General Information:   Regional anesthesia is when local anesthetic or  numbing  medication is injected around the nerves to anesthetize or  numb  the area supplied by that set of nerves. It is a type of analgesia used to control pain and decreases the need for narcotics following surgery.    Types of Regional Blocks:  Femoral: A block injected into the groin area of the operative leg of a patient having thigh or knee surgery.  Adductor Canal: A block injected into the mid thigh of the operative leg of a patient having knee or ankle surgery.  Popliteal or Distal Sciatic: A block injected into the back of the knee of the operative leg of patients having foot or ankle surgery.   Ankle:  An anesthetic medication is injected into the ankle of the operative leg of a patient having foot or toe surgery.     Procedure:   The type of anesthesia your doctor used to numb your leg will usually not wear off for 6-18 hours, but may last as long as 24 hours. You should be careful during that period, since it is possible to injure your leg without being aware of the injury. While your leg is numb you should:    Use crutches (minimal weight bearing until your motor and strength is completely back to normal)    Avoid striking or bumping your leg    Avoid extreme hot or cold    Discomfort:  You will have a tingling and prickly sensation in your leg as the feeling begins to return; you can also expect some discomfort. The amount of discomfort is unpredictable, but if you have more pain than can be controlled with pain medication, you should notify your physician.     Pain Medicine:   Begin taking your oral pain pills (if you have not already done so) before bedtime and during the night to avoid a sudden onset of pain as the block wears off.  Do not engage in drinking, driving, or hazardous occupations while taking pain medication.   [facility]  "Procedure Center  Home Care Following Anesthesia  For 24 hours after surgery:  1. Get plenty of rest.  A responsible adult must stay with you for at least 24 hours after you leave the surgery center.  2. Do not drive or use heavy equipment.  If you have weakness or tingling, don't drive or use heavy equipment until this feeling goes away.   3. Do not drink alcohol.   4. Avoid strenuous or risky activities.  Ask for help when climbing stairs.  5. You may feel lightheaded.  IF so, sit for a few minutes before standing.  Have someone help you get up.   6. If you have nausea (feel sick to your stomach): Drink only clear liquids such as apple juice, ginger ale, broth or 7-Up.  Rest may also help.  Be sure to drink enough fluids.  Move to a regular diet as you feel able.   7. You may have a slight fever.  Call the doctor if your fever is over 100 F (37.7 C) (taken under the tongue) or lasts longer than 24 hours.  8. You may have a dry mouth, a sore throat, muscle aches or trouble sleeping. These should go away after 24 hours.  9. Do not make important or legal decisions.        Today you received an Exparel block to numb the nerves near your surgery site.  This is a block using local anesthetic or \"numbing\" medication injected around the nerves to anesthetize or \"numb\" the area supplied by those nerves.  This block is injected into the muscle layer near your surgical site.  This medication may numb the location where you had surgery up to 72 hours.  If your surgical site is an arm or leg you should be careful with your affected limb, since it is possible to injure your limb without being aware of it due to the numbing.  Until full feeling returns, you should guard against bumping or hitting your limb, and avoid extreme hot or cold temperatures on the skin.  As the block wears off, the feeling will return as a tingling or prickly sensation near your surgical site.  You will experince more discomfort from your incision as " the feeling returns.  You may want to take a pain pill (a narcotic or Tylenol if this was prescribed by your surgeon) when you start to experience mild pain before the pain beomes more severe.  If your pain medications do not control your pain, you should notify your surgeon.    Tips for taking pain medications  To get the best pain relief possible, remember these points:    Take pain medications as directed, before pain becomes severe.    Pain medication can upset your stomach: taking it with food may help.    Constipation is a common side effect of pain medication. Drink plenty of  fluids.    Eat foods high in fiber. Take a stool softener if recommended by your doctor or pharmacist.    Do not drink alcohol, drive or operate machinery while taking pain medications.    Ask about other ways to control pain, such as with heat, ice or relaxation.    Tylenol/Acetaminophen Consumption  To help encourage the safe use of acetaminophen, the makers of TYLENOL  have lowered the maximum daily dose for single-ingredient Extra Strength TYLENOL  (acetaminophen) products sold in the [place] from 8 pills per day (4,000 mg) to 6 pills per day (3,000 mg). The dosing interval has also changed from 2 pills every 4-6 hours to 2 pills every 6 hours.    If you feel your pain relief is insufficient, you may take Tylenol/Acetaminophen in addition to your narcotic pain medication.     Be careful not to exceed 3,000 mg of Tylenol/Acetaminophen in a 24 hour period from all sources.    If you are taking extra strength Tylenol/acetaminophen (500 mg), the maximum dose is 6 tablets in 24 hours.    If you are taking regular strength acetaminophen (325 mg), the maximum dose is 9 tablets in 24 hours.    Call a doctor for any of the followin. Signs of infection (fever, growing tenderness at the surgery site, a large amount of drainage or bleeding, severe pain, foul-smelling drainage, redness, swelling).  2. It has been over 8 to 10 hours since  surgery and you are still not able to urinate (pass water).  3. Headache for over 24 hours.  4. Numbness, tingling or weakness the day after surgery (if you had spinal anesthesia).  Your doctor is:       Dr. Sunny Guillen, Orthopaedics: [phone]               Or dial [phone] and ask for the resident on call for:  Orthopaedics  For emergency care, call the:  [facility] [place] [facility] [phone] (TTY for hearing impaired: [phone])

## 2019-01-15 NOTE — OR NURSING
Patient received left side Adductor nerve block and I-Baugh nerve block with Exparel.  Fentanyl 50mcg and Versed 1mg given. Tolerated procedure well.

## 2019-01-15 NOTE — ANESTHESIA CARE TRANSFER NOTE
Patient: Rene Domínguez    Procedure(s):  Examination Under Anesthesia, Left Knee Anterior Cruciate Ligament Reconstruction, Bone Tendon Bone Autograft  Lateral Meniscus Repair    Diagnosis: Anterior Cruciate Ligament Tear  Diagnosis Additional Information: No value filed.    Anesthesia Type:   General, LMA, Peripheral Nerve Block for post-op pain at surgeon's request     Note:  Airway :Nasal Cannula  Patient transferred to:PACU  Comments: VSS and WNL, comfortable, no PONV, report to Jennifer RNHandoff Report: Identifed the Patient, Identified the Reponsible Provider, Reviewed the pertinent medical history, Discussed the surgical course, Reviewed Intra-OP anesthesia mangement and issues during anesthesia, Set expectations for post-procedure period and Allowed opportunity for questions and acknowledgement of understanding      Vitals: (Last set prior to Anesthesia Care Transfer)    CRNA VITALS  1/15/2019 1254 - 1/15/2019 1331      1/15/2019             Pulse:  93    SpO2:  97 %    Resp Rate (observed):  5  (Abnormal)                 Electronically Signed By: JER Marcus CRNA  January 15, 2019  1:31 PM

## 2019-01-18 ENCOUNTER — HOSPITAL ENCOUNTER (OUTPATIENT)
Dept: PHYSICAL THERAPY | Facility: OTHER | Age: 19
Setting detail: THERAPIES SERIES
End: 2019-01-18
Attending: ORTHOPAEDIC SURGERY
Payer: COMMERCIAL

## 2019-01-18 DIAGNOSIS — M25.562 CHRONIC PAIN OF LEFT KNEE: ICD-10-CM

## 2019-01-18 DIAGNOSIS — G89.29 CHRONIC PAIN OF LEFT KNEE: ICD-10-CM

## 2019-01-18 PROCEDURE — 97162 PT EVAL MOD COMPLEX 30 MIN: CPT | Mod: GP | Performed by: PHYSICAL THERAPIST

## 2019-01-18 PROCEDURE — 97110 THERAPEUTIC EXERCISES: CPT | Mod: GP | Performed by: PHYSICAL THERAPIST

## 2019-01-22 ENCOUNTER — TRANSFERRED RECORDS (OUTPATIENT)
Dept: HEALTH INFORMATION MANAGEMENT | Facility: CLINIC | Age: 19
End: 2019-01-22

## 2019-01-22 NOTE — ADDENDUM NOTE
Encounter addended by: Ronny Bravo, PT on: 1/22/2019 9:09 AM   Actions taken: Sign clinical note, Flowsheet accepted, Charge Capture section accepted

## 2019-01-22 NOTE — INITIAL ASSESSMENTS
01/18/19 1600   General Information   Type of Visit Initial OP Ortho PT Evaluation   Start of Care Date 01/18/19   Referring Physician BAY Guillen   Patient/Family Goals Statement return to work, student life, activites for exercise   Orders Evaluate and Treat   Insurance Type [employer]   Medical Diagnosis s/p ACL and meniscus repair.   Surgical/Medical history reviewed Yes   Weight-Bearing Status - LLE toe touch weight-bearing   Presentation and Etiology   Pertinent history of current problem (include personal factors and/or comorbidities that impact the POC) Injured knee playing  basketball on 12/26/18. Surgery for ACL and meniscus repair on 1/15/19.   Impairments A. Pain;B. Decreased WB tolerance;C. Swelling;D. Decreased ROM;E. Decreased flexibility;F. Decreased strength and endurance;G. Impaired balance;H. Impaired gait   Functional Limitations perform activities of daily living;perform required work activities;perform desired leisure / sports activities   Symptom Location left knee   How/Where did it occur During recreation/sports   Onset date of current episode/exacerbation 12/26/18   Chronicity New   Pain rating (0-10 point scale) Worst (/10)   Worst (/10) 10   Pain quality A. Sharp;C. Aching;D. Burning;E. Shooting;F. Stabbing   Frequency of pain/symptoms B. Intermittent   Pain/symptoms are: Worse during the day   Pain/symptoms exacerbated by B. Walking;G. Certain positions;I. Bending   Pain/symptoms eased by C. Rest;F. Certain positions;H. Cold;J. Braces/supports;I. OTC medication(s)   Current / Previous Interventions   MRI Results Results   MRI results acl disruption, mensuscus bucket handle tear lateral meniscus.   Prior Level of Function   Prior Level of Function-Mobility no limits   Prior Level of Function-ADLs no limits   Functional Level Prior Comment no limits   Current Level of Function   Current Community Support Other   Patient role/employment history B. Student;A. Employed   Employment  Comments lives in apartment   Living environment Apartment/condo   Current equipment-Gait/Locomotion Axillary crutches   Current equipment-ADL None   Fall Risk Screen   Fall screen completed by PT   Have you fallen 2 or more times in the past year? No   Have you fallen and had an injury in the past year? No   Is patient a fall risk? Yes   Fall screen comments due to crutch use.    Abuse Screen (yes response referral indicated)   Feels Unsafe at Home or Work/School no   Feels Threatened by Someone no   Does Anyone Try to Keep You From Having Contact with Others or Doing Things Outside Your Home? no   Physical Signs of Abuse Present no   Knee Objective Findings   Side (if bilateral, select both right and left) Left   Observation large dressing looks clean. locking hinged knee brace is used well, walks fair with crutches, at this time performing non Wt bearing.   Integumentary  incisions remain to have healthy look and no drainage, steristrips in place. patient instructed on showering and protecting healing incisions   Gait/Locomotion axillary crutches    Balance/Proprioception (Single Leg Stance) good on single leg of right   Knee ROM Comment not tested, swelling limits full extension, flexion in sitting was able to get to 30.   Knee/Hip Strength Comments not tested,   Knee Flexibility Comments not tested   Knee Special Test Comments not tested due to srugery   Palpation patellar slides well.   Left Gastrocnemius Flexibility fair   Planned Therapy Interventions   Planned Therapy Interventions balance training;gait training;joint mobilization;manual therapy;neuromuscular re-education;orthotic fitting/training;ROM;strengthening;stretching;transfer training   Planned Modality Interventions   Planned Modality Interventions Cryotherapy;Electrical stimulation;Hot packs   Clinical Impression   Criteria for Skilled Therapeutic Interventions Met yes, treatment indicated   PT Diagnosis decreased ROM/strength/gait s/p ACL  repair.   Influenced by the following impairments recent surgery   Clinical Presentation Stable/Uncomplicated   Clinical Decision Making (Complexity) Moderate complexity   Predicted Duration of Therapy Intervention (days/wks) 1-2x/wk   Risk & Benefits of therapy have been explained Yes   Patient, Family & other staff in agreement with plan of care Yes   Ortho Goal 1   Goal Identifier ROM   Goal Description Full ROM of knee for improved mobility and strength work   Target Date 02/22/19   Ortho Goal 2   Goal Identifier walking   Goal Description walking with normal gait pattern and no device.   Target Date 03/01/19   Ortho Goal 3   Goal Identifier run   Goal Description patient will return to jogging   Target Date 03/29/19   Total Evaluation Time   PT Ronak, Moderate Complexity Minutes (86949) 30

## 2019-01-22 NOTE — ADDENDUM NOTE
Encounter addended by: Ronny Bravo, PT on: 1/22/2019 7:49 AM   Actions taken: Episode edited, Flowsheet accepted

## 2019-01-23 ENCOUNTER — HOSPITAL ENCOUNTER (OUTPATIENT)
Dept: PHYSICAL THERAPY | Facility: OTHER | Age: 19
Setting detail: THERAPIES SERIES
End: 2019-01-23
Attending: ORTHOPAEDIC SURGERY
Payer: COMMERCIAL

## 2019-01-23 PROCEDURE — 97110 THERAPEUTIC EXERCISES: CPT | Mod: GP | Performed by: PHYSICAL THERAPIST

## 2019-01-30 ENCOUNTER — HOSPITAL ENCOUNTER (OUTPATIENT)
Dept: PHYSICAL THERAPY | Facility: OTHER | Age: 19
Setting detail: THERAPIES SERIES
End: 2019-01-30
Attending: ORTHOPAEDIC SURGERY
Payer: COMMERCIAL

## 2019-01-30 PROCEDURE — 97110 THERAPEUTIC EXERCISES: CPT | Mod: GP | Performed by: PHYSICAL THERAPIST

## 2019-01-30 PROCEDURE — 97116 GAIT TRAINING THERAPY: CPT | Mod: GP | Performed by: PHYSICAL THERAPIST

## 2019-02-01 ENCOUNTER — HOSPITAL ENCOUNTER (OUTPATIENT)
Dept: PHYSICAL THERAPY | Facility: OTHER | Age: 19
Setting detail: THERAPIES SERIES
End: 2019-02-01
Attending: ORTHOPAEDIC SURGERY
Payer: COMMERCIAL

## 2019-02-01 PROCEDURE — 97110 THERAPEUTIC EXERCISES: CPT | Mod: GP | Performed by: PHYSICAL THERAPIST

## 2019-02-06 ENCOUNTER — HOSPITAL ENCOUNTER (OUTPATIENT)
Dept: PHYSICAL THERAPY | Facility: OTHER | Age: 19
Setting detail: THERAPIES SERIES
End: 2019-02-06
Attending: ORTHOPAEDIC SURGERY
Payer: COMMERCIAL

## 2019-02-06 PROCEDURE — 97110 THERAPEUTIC EXERCISES: CPT | Mod: GP | Performed by: PHYSICAL THERAPIST

## 2019-02-06 NOTE — ADDENDUM NOTE
Encounter addended by: Ronny Bravo, PT on: 2/6/2019 5:15 PM   Actions taken: Flowsheet accepted, Charge Capture section accepted

## 2019-02-06 NOTE — ADDENDUM NOTE
Encounter addended by: Ronny Bravo, PT on: 2/6/2019 5:15 PM   Actions taken: Charge Capture section accepted

## 2019-02-08 ENCOUNTER — HOSPITAL ENCOUNTER (OUTPATIENT)
Dept: PHYSICAL THERAPY | Facility: OTHER | Age: 19
Setting detail: THERAPIES SERIES
End: 2019-02-08
Attending: ORTHOPAEDIC SURGERY
Payer: COMMERCIAL

## 2019-02-08 PROCEDURE — 97110 THERAPEUTIC EXERCISES: CPT | Mod: GP | Performed by: PHYSICAL THERAPIST

## 2019-02-15 ENCOUNTER — HOSPITAL ENCOUNTER (OUTPATIENT)
Dept: PHYSICAL THERAPY | Facility: OTHER | Age: 19
Setting detail: THERAPIES SERIES
End: 2019-02-15
Attending: ORTHOPAEDIC SURGERY
Payer: COMMERCIAL

## 2019-02-15 PROCEDURE — 97110 THERAPEUTIC EXERCISES: CPT | Mod: GP | Performed by: PHYSICAL THERAPIST

## 2019-02-28 ENCOUNTER — OFFICE VISIT (OUTPATIENT)
Dept: ORTHOPEDICS | Facility: CLINIC | Age: 19
End: 2019-02-28
Payer: COMMERCIAL

## 2019-02-28 VITALS — OXYGEN SATURATION: 100 % | HEART RATE: 58 BPM | DIASTOLIC BLOOD PRESSURE: 83 MMHG | SYSTOLIC BLOOD PRESSURE: 139 MMHG

## 2019-02-28 DIAGNOSIS — M25.562 CHRONIC PAIN OF LEFT KNEE: Primary | ICD-10-CM

## 2019-02-28 DIAGNOSIS — G89.29 CHRONIC PAIN OF LEFT KNEE: Primary | ICD-10-CM

## 2019-02-28 PROCEDURE — 99024 POSTOP FOLLOW-UP VISIT: CPT | Performed by: ORTHOPAEDIC SURGERY

## 2019-02-28 NOTE — NURSING NOTE
Rene Domínguez's chief complaint for this visit includes:  Chief Complaint   Patient presents with     RECHECK     6 week postop     PCP: No Ref-Primary, Physician    Referring Provider:  No referring provider defined for this encounter.    /83   Pulse 58   SpO2 100%   Data Unavailable     Do you need any medication refills at today's visit? no

## 2019-02-28 NOTE — PATIENT INSTRUCTIONS
Thanks for coming today.  Ortho/Sports Medicine Clinic  [address] [place] [address]    To schedule future appointments in Ortho Clinic, you may call [phone].    To schedule ordered imaging by your Provider: Call [facility] at [phone]    [place] available online at:   [email or web address]    Please call if any further questions or concerns [phone] and ask for the Orthopedic Department. Clinic hours 8 am to 5 pm.    Return to clinic if symptoms worsen.

## 2019-02-28 NOTE — LETTER
2/28/2019         RE: Rene Domínguez  [address] [address] [id number]        Dear Colleague,    Thank you for referring your patient, Rene Domínguez, to the [facility]. Please see a copy of my visit note below.    DIAGNOSIS:   1. Lateral meniscus tear  2.  grade 3 rupture of the anterior cruciate ligament     PROCEDURES:  1. Inside out lateral meniscus repair she has not ACL reconstruction with bone tendon bone autograft    Date of surgery: 01/15/2019 so    HISTORY:  6 weeks status post the above procedure.  Doing very well.  No pain.  Full extension.  Doing therapy.  Was seen locally for his first follow-up visit.    EXAM:     General: Awake, Alert, and oriented. Articulates and communicates with a normal affect     Left lower Extremity:    Incisions well healed without evidence of infection    Normal post-operative effusion and ecchymosis    Range of motion and stability exam not performed    Neurovascularly intact    IMAGING:  Currently none    ASSESSMENT:  1. 6 weeks status post ACL reconstruction, bone tendon bone autograft, inside-out lateral meniscus repair    PLAN:     Weightbearing: WBAT    Range of Motion: No range of motion restrictions    Pain Medications: We reviewed post-operative pain medications at today's visit. The patient has stopped all opioid pain medications and no further refills are required    Extension: We reviewed the importance of full knee extension and demonstrated the relevant exercises as appropriate    Crutches/Brace: Patient no longer requires the hinged knee brace or crutches.     Acitivity Restrictions:    Discussed that this is the dangerous time after ACL reconstruction    Reviewed activity restrictions at today's visit    Goal to progress strength and motion to allow straight line running at three months from the date of surgery    Follow up: 6 weeks with AP and lateral radiographs       Again, thank you for allowing me to participate  in the care of your patient.        Sincerely,        Sunny Guillen MD

## 2019-02-28 NOTE — PROGRESS NOTES
DIAGNOSIS:   1. Lateral meniscus tear  2.  grade 3 rupture of the anterior cruciate ligament     PROCEDURES:  1. Inside out lateral meniscus repair she has not ACL reconstruction with bone tendon bone autograft    Date of surgery: 01/15/2019 so    HISTORY:  6 weeks status post the above procedure.  Doing very well.  No pain.  Full extension.  Doing therapy.  Was seen locally for his first follow-up visit.    EXAM:     General: Awake, Alert, and oriented. Articulates and communicates with a normal affect     Left lower Extremity:    Incisions well healed without evidence of infection    Normal post-operative effusion and ecchymosis    Range of motion and stability exam not performed    Neurovascularly intact    IMAGING:  Currently none    ASSESSMENT:  1. 6 weeks status post ACL reconstruction, bone tendon bone autograft, inside-out lateral meniscus repair    PLAN:     Weightbearing: WBAT    Range of Motion: No range of motion restrictions    Pain Medications: We reviewed post-operative pain medications at today's visit. The patient has stopped all opioid pain medications and no further refills are required    Extension: We reviewed the importance of full knee extension and demonstrated the relevant exercises as appropriate    Crutches/Brace: Patient no longer requires the hinged knee brace or crutches.     Acitivity Restrictions:    Discussed that this is the dangerous time after ACL reconstruction    Reviewed activity restrictions at today's visit    Goal to progress strength and motion to allow straight line running at three months from the date of surgery    Follow up: 6 weeks with AP and lateral radiographs

## 2019-03-01 ENCOUNTER — HOSPITAL ENCOUNTER (OUTPATIENT)
Dept: PHYSICAL THERAPY | Facility: OTHER | Age: 19
Setting detail: THERAPIES SERIES
End: 2019-03-01
Attending: ORTHOPAEDIC SURGERY
Payer: COMMERCIAL

## 2019-03-01 PROCEDURE — 97110 THERAPEUTIC EXERCISES: CPT | Mod: GP | Performed by: PHYSICAL THERAPIST

## 2019-03-08 ENCOUNTER — HOSPITAL ENCOUNTER (OUTPATIENT)
Dept: PHYSICAL THERAPY | Facility: OTHER | Age: 19
Setting detail: THERAPIES SERIES
End: 2019-03-08
Attending: ORTHOPAEDIC SURGERY
Payer: COMMERCIAL

## 2019-03-08 PROCEDURE — 97110 THERAPEUTIC EXERCISES: CPT | Mod: GP | Performed by: PHYSICAL THERAPIST

## 2019-03-29 ENCOUNTER — HOSPITAL ENCOUNTER (OUTPATIENT)
Dept: PHYSICAL THERAPY | Facility: OTHER | Age: 19
Setting detail: THERAPIES SERIES
End: 2019-03-29
Attending: ORTHOPAEDIC SURGERY
Payer: COMMERCIAL

## 2019-03-29 PROCEDURE — 97110 THERAPEUTIC EXERCISES: CPT | Mod: GP | Performed by: PHYSICAL THERAPIST

## 2019-04-05 ENCOUNTER — HOSPITAL ENCOUNTER (OUTPATIENT)
Dept: PHYSICAL THERAPY | Facility: OTHER | Age: 19
Setting detail: THERAPIES SERIES
End: 2019-04-05
Attending: ORTHOPAEDIC SURGERY
Payer: COMMERCIAL

## 2019-04-05 PROCEDURE — 97110 THERAPEUTIC EXERCISES: CPT | Mod: GP | Performed by: PHYSICAL THERAPIST

## 2019-04-07 ENCOUNTER — OFFICE VISIT (OUTPATIENT)
Dept: FAMILY MEDICINE | Facility: OTHER | Age: 19
End: 2019-04-07
Attending: NURSE PRACTITIONER
Payer: COMMERCIAL

## 2019-04-07 VITALS
DIASTOLIC BLOOD PRESSURE: 60 MMHG | HEART RATE: 68 BPM | TEMPERATURE: 97.3 F | SYSTOLIC BLOOD PRESSURE: 122 MMHG | WEIGHT: 193 LBS | RESPIRATION RATE: 16 BRPM | BODY MASS INDEX: 26.92 KG/M2

## 2019-04-07 DIAGNOSIS — Z02.89 ENCOUNTER TO OBTAIN EXCUSE FROM WORK: Primary | ICD-10-CM

## 2019-04-07 PROCEDURE — 99212 OFFICE O/P EST SF 10 MIN: CPT | Performed by: NURSE PRACTITIONER

## 2019-04-07 ASSESSMENT — ENCOUNTER SYMPTOMS
NAUSEA: 0
NEUROLOGICAL NEGATIVE: 1
CONSTITUTIONAL NEGATIVE: 1
VOMITING: 1
CONSTIPATION: 0
DIARRHEA: 0
RESPIRATORY NEGATIVE: 1
ABDOMINAL PAIN: 0
MUSCULOSKELETAL NEGATIVE: 1

## 2019-04-07 ASSESSMENT — PAIN SCALES - GENERAL: PAINLEVEL: NO PAIN (0)

## 2019-04-07 NOTE — LETTER
April 7, 2019      To Whom It May Concern:      Rene Domínguez was evaluated for illness today.  Can return to work without restriction when improved.     Sincerely,      JER Dozier, NP-C  [facility]  8:11 PM April 7, 2019

## 2019-04-08 NOTE — PROGRESS NOTES
"  SUBJECTIVE:   Rene Domínguez is a 19 year old male who presents to clinic today for the following health issues:    HPI  Here for a work note, employer required.   Vomiting this morning. Sweating. No fever, no belly pain.   Has been pushing fluids today, not eating much.   Vomiting resolved.     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     Priority: Medium     Vitiligo 04/08/2010     Priority: Medium     Past Medical History:   Diagnosis Date     Concussion with loss of consciousness     x1 reports (\"being out for 3 min\".)     Other injury of unspecified body region     fx of forefoot     Other specified epidermal thickening     No Comments Provided     Undescended testicle     No Comments Provided     Vitiligo     has had derm consult      Past Surgical History:   Procedure Laterality Date     ARTHROSCOPIC RECONSTRUCTION ANTERIOR CRUCIATE LIGAMENT BONE TENDON BONE AUTOGRAFT Left 1/15/2019    Procedure: Examination Under Anesthesia, Left Knee Anterior Cruciate Ligament Reconstruction, Bone Tendon Bone Autograft;  Surgeon: Sunny Guillen MD;  Location: [facility] OR     ARTHROSCOPY KNEE WITH MENISCAL REPAIR Left 1/15/2019    Procedure: Lateral Meniscus Repair;  Surgeon: Sunny Guillen MD;  Location: [facility] OR     HERNIORRHAPHY UMBILICAL N/A 11/6/2018    Procedure: Umbilical Exploration, Excision of Urachal Cyst;  Surgeon: Rj Romero MD;  Location:  OR     testicular surgery  2012    [facility]     wisdom teeth       Family History   Problem Relation Age of Onset     " Hypertension Father         Hypertension     Hypertension Maternal Grandfather         Hypertension     Prostate Cancer Maternal Grandfather         Cancer-prostate     Thyroid Disease Maternal Grandmother         Thyroid Disease     Social History     Tobacco Use     Smoking status: Never Smoker     Smokeless tobacco: Never Used   Substance Use Topics     Alcohol use: No     Social History     Social History Narrative    Graduated from [employer] 2017, going to [employer] in the fall.   Lives with mother and stepfather.     Current Outpatient Medications   Medication Sig Dispense Refill     acetaminophen (TYLENOL) 325 MG tablet Take 2 tablets (650 mg) by mouth every 4 hours 120 tablet 0     Allergies   Allergen Reactions     Amoxicillin Rash and Hives     As a child       Review of Systems   Constitutional: Negative.    HENT: Negative.    Respiratory: Negative.    Gastrointestinal: Positive for vomiting (Vomited this morning). Negative for abdominal pain, constipation, diarrhea and nausea.   Musculoskeletal: Negative.    Neurological: Negative.         OBJECTIVE:     /60 (BP Location: Right arm, Patient Position: Sitting, Cuff Size: Adult Regular)   Pulse 68   Temp 97.3  F (36.3  C) (Tympanic)   Resp 16   Wt 87.5 kg (193 lb)   BMI 26.92 kg/m    Body mass index is 26.92 kg/m .  Physical Exam   Constitutional: He is oriented to person, place, and time. He appears well-developed and well-nourished.   HENT:   Head: Normocephalic and atraumatic.   Right Ear: External ear normal.   Left Ear: External ear normal.   Eyes: Conjunctivae are normal. Right eye exhibits no discharge. Left eye exhibits no discharge. No scleral icterus.   Neck: Normal range of motion.   Cardiovascular: Normal rate.   Pulmonary/Chest: Effort normal.   Musculoskeletal: Normal range of motion.   Neurological: He is alert and oriented to person, place, and time.   Skin: Skin is warm and dry.   Psychiatric: He has a normal mood and affect. His  behavior is normal.   Nursing note and vitals reviewed.      Diagnostic Test Results:  No results found for this or any previous visit (from the past 24 hour(s)).    ASSESSMENT/PLAN:       ICD-10-CM    1. Encounter to obtain excuse from work Z02.89      PLAN:  Healthy, alerty, active young many in NAD. No appearance of illness.   Given work note. Advised to f/u if not improving or sx worsen.     JER Dozier, NP-C  4/7/2019 at 8:09 PM  [facility]

## 2019-04-08 NOTE — NURSING NOTE
"Chief Complaint   Patient presents with     Vomiting     started today.        Initial /60 (BP Location: Right arm, Patient Position: Sitting, Cuff Size: Adult Regular)   Pulse 68   Temp 97.3  F (36.3  C) (Tympanic)   Resp 16   Wt 87.5 kg (193 lb)   BMI 26.92 kg/m   Estimated body mass index is 26.92 kg/m  as calculated from the following:    Height as of 1/14/19: 1.803 m (5' 11\").    Weight as of this encounter: 87.5 kg (193 lb).  Medication Reconciliation: Completed     Neris Hermosillo LPN  "

## 2019-04-11 ENCOUNTER — TELEPHONE (OUTPATIENT)
Dept: FAMILY MEDICINE | Facility: OTHER | Age: 19
End: 2019-04-11

## 2019-04-11 ENCOUNTER — OFFICE VISIT (OUTPATIENT)
Dept: FAMILY MEDICINE | Facility: OTHER | Age: 19
End: 2019-04-11
Attending: FAMILY MEDICINE
Payer: COMMERCIAL

## 2019-04-11 VITALS
TEMPERATURE: 98.1 F | BODY MASS INDEX: 26.14 KG/M2 | WEIGHT: 193 LBS | HEIGHT: 72 IN | HEART RATE: 60 BPM | RESPIRATION RATE: 14 BRPM | SYSTOLIC BLOOD PRESSURE: 122 MMHG | DIASTOLIC BLOOD PRESSURE: 60 MMHG

## 2019-04-11 DIAGNOSIS — R61 GENERALIZED HYPERHIDROSIS: Primary | ICD-10-CM

## 2019-04-11 DIAGNOSIS — R61 GENERALIZED HYPERHIDROSIS: ICD-10-CM

## 2019-04-11 PROCEDURE — 99213 OFFICE O/P EST LOW 20 MIN: CPT | Performed by: FAMILY MEDICINE

## 2019-04-11 ASSESSMENT — ENCOUNTER SYMPTOMS
FEVER: 0
ARTHRALGIAS: 0
DIAPHORESIS: 1
FATIGUE: 0

## 2019-04-11 ASSESSMENT — PAIN SCALES - GENERAL: PAINLEVEL: NO PAIN (0)

## 2019-04-11 ASSESSMENT — ANXIETY QUESTIONNAIRES
IF YOU CHECKED OFF ANY PROBLEMS ON THIS QUESTIONNAIRE, HOW DIFFICULT HAVE THESE PROBLEMS MADE IT FOR YOU TO DO YOUR WORK, TAKE CARE OF THINGS AT HOME, OR GET ALONG WITH OTHER PEOPLE: NOT DIFFICULT AT ALL
3. WORRYING TOO MUCH ABOUT DIFFERENT THINGS: NOT AT ALL
7. FEELING AFRAID AS IF SOMETHING AWFUL MIGHT HAPPEN: NOT AT ALL
6. BECOMING EASILY ANNOYED OR IRRITABLE: NOT AT ALL
2. NOT BEING ABLE TO STOP OR CONTROL WORRYING: NOT AT ALL
GAD7 TOTAL SCORE: 0
1. FEELING NERVOUS, ANXIOUS, OR ON EDGE: NOT AT ALL
5. BEING SO RESTLESS THAT IT IS HARD TO SIT STILL: NOT AT ALL

## 2019-04-11 ASSESSMENT — PATIENT HEALTH QUESTIONNAIRE - PHQ9: 5. POOR APPETITE OR OVEREATING: NOT AT ALL

## 2019-04-11 ASSESSMENT — MIFFLIN-ST. JEOR: SCORE: 1915.5

## 2019-04-11 NOTE — NURSING NOTE
"Coming in for excessive sweating    Chief Complaint   Patient presents with     Excessive Sweating     on going       Initial /60 (BP Location: Right arm, Patient Position: Sitting, Cuff Size: Adult Large)   Pulse 60   Temp 98.1  F (36.7  C) (Tympanic)   Resp 14   Ht 1.816 m (5' 11.5\")   Wt 87.5 kg (193 lb)   BMI 26.54 kg/m   Estimated body mass index is 26.54 kg/m  as calculated from the following:    Height as of this encounter: 1.816 m (5' 11.5\").    Weight as of this encounter: 87.5 kg (193 lb).  Medication Reconciliation: complete    Zoe Dill LPN  "

## 2019-04-11 NOTE — TELEPHONE ENCOUNTER
Call pharmacy and have them suggest alternatives.  Different concentration?  Not available everywhere?  Vladimir Maldonado MD on 4/11/2019 at 11:04 AM

## 2019-04-11 NOTE — TELEPHONE ENCOUNTER
Spoke with pharmacist and there are no other concentrations of drysol. He states it is not available anymore.     Suzy Kearney LPN...................4/11/2019  2:50 PM

## 2019-04-11 NOTE — TELEPHONE ENCOUNTER
Rocael Galarza and everything in this concentration is on back order  Zoe Dill LPN on 4/11/2019 at 1:06 PM

## 2019-04-11 NOTE — TELEPHONE ENCOUNTER
Is there a different concentration? Higher right lower?  Vladimir Maldonado MD on 4/11/2019 at 1:09 PM

## 2019-04-11 NOTE — TELEPHONE ENCOUNTER
Patient notified of Dr Maldonado's response. He will  the prescription at [facility] pharmacy.

## 2019-04-11 NOTE — PROGRESS NOTES
SUBJECTIVE:   Rene Domínguez is a 19 year old male who presents to clinic today for the following health issues:    HPI  Significant sweating.  Most of this is in his axillae.  Will sometimes have to change t shirts half way through the day.  Has done some research and feels he has hyperhidrosis.      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     Priority: Medium     Vitiligo 04/08/2010     Priority: Medium     Past Surgical History:   Procedure Laterality Date     ARTHROSCOPIC RECONSTRUCTION ANTERIOR CRUCIATE LIGAMENT BONE TENDON BONE AUTOGRAFT Left 1/15/2019    Procedure: Examination Under Anesthesia, Left Knee Anterior Cruciate Ligament Reconstruction, Bone Tendon Bone Autograft;  Surgeon: Sunny Guillen MD;  Location: [facility] OR     ARTHROSCOPY KNEE WITH MENISCAL REPAIR Left 1/15/2019    Procedure: Lateral Meniscus Repair;  Surgeon: Sunny Guillen MD;  Location: [facility] OR     HERNIORRHAPHY UMBILICAL N/A 11/6/2018    Procedure: Umbilical Exploration, Excision of Urachal Cyst;  Surgeon: Rj Romero MD;  Location:  OR     testicular surgery  2012    [facility]     wisdom teeth       Social History     Tobacco Use     Smoking status: Never Smoker     Smokeless tobacco: Never Used   Substance Use Topics     Alcohol use: No     Current Outpatient Medications   Medication Sig Dispense Refill     acetaminophen (TYLENOL) 325 MG tablet Take 2 tablets (650 mg) by mouth every 4 hours 120 tablet 0     Allergies   Allergen Reactions     Amoxicillin Rash and Hives     As a  "child       Review of Systems   Constitutional: Positive for diaphoresis. Negative for fatigue and fever.   Musculoskeletal: Negative for arthralgias.   Skin: Negative for rash.        OBJECTIVE:     /60 (BP Location: Right arm, Patient Position: Sitting, Cuff Size: Adult Large)   Pulse 60   Temp 98.1  F (36.7  C) (Tympanic)   Resp 14   Ht 1.816 m (5' 11.5\")   Wt 87.5 kg (193 lb)   BMI 26.54 kg/m    Body mass index is 26.54 kg/m .  Physical Exam   Constitutional: He is oriented to person, place, and time. He appears well-developed. No distress.   Neurological: He is alert and oriented to person, place, and time.   Skin: Skin is warm and dry. He is not diaphoretic.   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  If this is not working, then consult with derm for aggressive interventions such as botox or even surgery.  Plan: aluminum chloride (DRYSOL) 20 % external         solution                 Vladimir Maldonado MD  [facility]    "

## 2019-04-11 NOTE — TELEPHONE ENCOUNTER
Call pt, tell him what they are saying.  I personally called our pharmacy, and they have this in stock so prescription was sent there.  Vladimir Maldonado MD on 4/11/2019 at 3:07 PM

## 2019-04-12 ASSESSMENT — ANXIETY QUESTIONNAIRES: GAD7 TOTAL SCORE: 0

## 2019-05-03 ENCOUNTER — HOSPITAL ENCOUNTER (OUTPATIENT)
Dept: PHYSICAL THERAPY | Facility: OTHER | Age: 19
Setting detail: THERAPIES SERIES
End: 2019-05-03
Attending: ORTHOPAEDIC SURGERY
Payer: COMMERCIAL

## 2019-05-03 PROCEDURE — 97140 MANUAL THERAPY 1/> REGIONS: CPT | Mod: GP | Performed by: PHYSICAL THERAPIST

## 2019-05-03 PROCEDURE — 97110 THERAPEUTIC EXERCISES: CPT | Mod: GP | Performed by: PHYSICAL THERAPIST

## 2019-06-06 ENCOUNTER — HOSPITAL ENCOUNTER (EMERGENCY)
Facility: OTHER | Age: 19
Discharge: HOME | End: 2019-06-06
Attending: PHYSICIAN ASSISTANT | Admitting: PHYSICIAN ASSISTANT
Payer: COMMERCIAL

## 2019-06-06 ENCOUNTER — APPOINTMENT (OUTPATIENT)
Dept: GENERAL RADIOLOGY | Facility: OTHER | Age: 19
End: 2019-06-06
Attending: PHYSICIAN ASSISTANT
Payer: COMMERCIAL

## 2019-06-06 ENCOUNTER — APPOINTMENT (OUTPATIENT)
Dept: CT IMAGING | Facility: OTHER | Age: 19
End: 2019-06-06
Attending: PHYSICIAN ASSISTANT
Payer: COMMERCIAL

## 2019-06-06 VITALS
HEIGHT: 71 IN | DIASTOLIC BLOOD PRESSURE: 69 MMHG | OXYGEN SATURATION: 98 % | RESPIRATION RATE: 18 BRPM | BODY MASS INDEX: 27.02 KG/M2 | WEIGHT: 193 LBS | SYSTOLIC BLOOD PRESSURE: 123 MMHG | TEMPERATURE: 97.3 F

## 2019-06-06 DIAGNOSIS — J18.9 LEFT UPPER LOBE PNEUMONIA: ICD-10-CM

## 2019-06-06 DIAGNOSIS — R07.81 PLEURITIC CHEST PAIN: ICD-10-CM

## 2019-06-06 LAB
ALBUMIN SERPL-MCNC: 4.6 G/DL (ref 3.5–5.7)
ALP SERPL-CCNC: 57 U/L (ref 34–104)
ALT SERPL W P-5'-P-CCNC: 12 U/L (ref 7–52)
ANION GAP SERPL CALCULATED.3IONS-SCNC: 9 MMOL/L (ref 3–14)
AST SERPL W P-5'-P-CCNC: 14 U/L (ref 13–39)
BASOPHILS # BLD AUTO: 0 10E9/L (ref 0–0.2)
BASOPHILS NFR BLD AUTO: 0.3 %
BILIRUB SERPL-MCNC: 1 MG/DL (ref 0.3–1)
BUN SERPL-MCNC: 16 MG/DL (ref 7–25)
CALCIUM SERPL-MCNC: 10 MG/DL (ref 8.6–10.3)
CHLORIDE SERPL-SCNC: 100 MMOL/L (ref 98–107)
CO2 SERPL-SCNC: 28 MMOL/L (ref 21–31)
CREAT SERPL-MCNC: 0.86 MG/DL (ref 0.7–1.3)
D DIMER PPP DDU-MCNC: <200 NG/ML D-DU (ref 0–230)
DIFFERENTIAL METHOD BLD: ABNORMAL
EOSINOPHIL # BLD AUTO: 0.1 10E9/L (ref 0–0.7)
EOSINOPHIL NFR BLD AUTO: 1.1 %
ERYTHROCYTE [DISTWIDTH] IN BLOOD BY AUTOMATED COUNT: 12.7 % (ref 10–15)
GFR SERPL CREATININE-BSD FRML MDRD: NORMAL ML/MIN/{1.73_M2}
GLUCOSE SERPL-MCNC: 85 MG/DL (ref 70–105)
HCT VFR BLD AUTO: 48.5 % (ref 40–53)
HGB BLD-MCNC: 16.8 G/DL (ref 13.3–17.7)
IMM GRANULOCYTES # BLD: 0.1 10E9/L (ref 0–0.4)
IMM GRANULOCYTES NFR BLD: 0.5 %
INR PPP: 1.15 (ref 0–1.3)
LYMPHOCYTES # BLD AUTO: 1.6 10E9/L (ref 0.8–5.3)
LYMPHOCYTES NFR BLD AUTO: 14.5 %
MCH RBC QN AUTO: 31.5 PG (ref 26.5–33)
MCHC RBC AUTO-ENTMCNC: 34.6 G/DL (ref 31.5–36.5)
MCV RBC AUTO: 91 FL (ref 78–100)
MONOCYTES # BLD AUTO: 0.6 10E9/L (ref 0–1.3)
MONOCYTES NFR BLD AUTO: 5 %
NEUTROPHILS # BLD AUTO: 8.7 10E9/L (ref 1.6–8.3)
NEUTROPHILS NFR BLD AUTO: 78.6 %
NT-PROBNP SERPL-MCNC: 10 PG/ML (ref 0–100)
PLATELET # BLD AUTO: 275 10E9/L (ref 150–450)
POTASSIUM SERPL-SCNC: 3.9 MMOL/L (ref 3.5–5.1)
PROT SERPL-MCNC: 7.8 G/DL (ref 6.4–8.9)
RBC # BLD AUTO: 5.33 10E12/L (ref 4.4–5.9)
SODIUM SERPL-SCNC: 137 MMOL/L (ref 134–144)
TROPONIN I SERPL-MCNC: <0.03 UG/L (ref 0–0.03)
WBC # BLD AUTO: 11 10E9/L (ref 4–11)

## 2019-06-06 PROCEDURE — 93010 ELECTROCARDIOGRAM REPORT: CPT | Performed by: INTERNAL MEDICINE

## 2019-06-06 PROCEDURE — 80053 COMPREHEN METABOLIC PANEL: CPT | Performed by: PHYSICIAN ASSISTANT

## 2019-06-06 PROCEDURE — 99285 EMERGENCY DEPT VISIT HI MDM: CPT | Mod: 25 | Performed by: PHYSICIAN ASSISTANT

## 2019-06-06 PROCEDURE — 99284 EMERGENCY DEPT VISIT MOD MDM: CPT | Mod: Z6 | Performed by: PHYSICIAN ASSISTANT

## 2019-06-06 PROCEDURE — 85025 COMPLETE CBC W/AUTO DIFF WBC: CPT | Performed by: PHYSICIAN ASSISTANT

## 2019-06-06 PROCEDURE — 83880 ASSAY OF NATRIURETIC PEPTIDE: CPT | Performed by: PHYSICIAN ASSISTANT

## 2019-06-06 PROCEDURE — 71260 CT THORAX DX C+: CPT

## 2019-06-06 PROCEDURE — 85610 PROTHROMBIN TIME: CPT | Performed by: PHYSICIAN ASSISTANT

## 2019-06-06 PROCEDURE — 25500064 ZZH RX 255 OP 636: Performed by: PHYSICIAN ASSISTANT

## 2019-06-06 PROCEDURE — 85379 FIBRIN DEGRADATION QUANT: CPT | Performed by: PHYSICIAN ASSISTANT

## 2019-06-06 PROCEDURE — 71046 X-RAY EXAM CHEST 2 VIEWS: CPT

## 2019-06-06 PROCEDURE — 36415 COLL VENOUS BLD VENIPUNCTURE: CPT | Performed by: PHYSICIAN ASSISTANT

## 2019-06-06 PROCEDURE — 84484 ASSAY OF TROPONIN QUANT: CPT | Performed by: PHYSICIAN ASSISTANT

## 2019-06-06 PROCEDURE — 93005 ELECTROCARDIOGRAM TRACING: CPT | Performed by: PHYSICIAN ASSISTANT

## 2019-06-06 PROCEDURE — 25000128 H RX IP 250 OP 636: Performed by: PHYSICIAN ASSISTANT

## 2019-06-06 PROCEDURE — 96372 THER/PROPH/DIAG INJ SC/IM: CPT | Mod: XU | Performed by: PHYSICIAN ASSISTANT

## 2019-06-06 PROCEDURE — 25000132 ZZH RX MED GY IP 250 OP 250 PS 637: Performed by: PHYSICIAN ASSISTANT

## 2019-06-06 RX ORDER — LEVOFLOXACIN 750 MG/1
750 TABLET, FILM COATED ORAL DAILY
Qty: 10 TABLET | Refills: 0 | Status: SHIPPED | OUTPATIENT
Start: 2019-06-06 | End: 2019-06-16

## 2019-06-06 RX ORDER — LEVOFLOXACIN 750 MG/1
750 TABLET, FILM COATED ORAL ONCE
Status: COMPLETED | OUTPATIENT
Start: 2019-06-06 | End: 2019-06-06

## 2019-06-06 RX ORDER — IBUPROFEN 800 MG/1
800 TABLET, FILM COATED ORAL EVERY 8 HOURS PRN
Qty: 60 TABLET | Refills: 0 | Status: SHIPPED | OUTPATIENT
Start: 2019-06-06 | End: 2019-06-14

## 2019-06-06 RX ORDER — HYDROXYZINE HYDROCHLORIDE 10 MG/1
50 TABLET, FILM COATED ORAL ONCE
Status: COMPLETED | OUTPATIENT
Start: 2019-06-06 | End: 2019-06-06

## 2019-06-06 RX ORDER — KETOROLAC TROMETHAMINE 30 MG/ML
60 INJECTION, SOLUTION INTRAMUSCULAR; INTRAVENOUS ONCE
Status: COMPLETED | OUTPATIENT
Start: 2019-06-06 | End: 2019-06-06

## 2019-06-06 RX ADMIN — IOHEXOL 100 ML: 350 INJECTION, SOLUTION INTRAVENOUS at 20:28

## 2019-06-06 RX ADMIN — LEVOFLOXACIN 750 MG: 750 TABLET, FILM COATED ORAL at 21:04

## 2019-06-06 RX ADMIN — HYDROXYZINE HYDROCHLORIDE 50 MG: 10 TABLET ORAL at 18:56

## 2019-06-06 RX ADMIN — KETOROLAC TROMETHAMINE 60 MG: 30 INJECTION, SOLUTION INTRAMUSCULAR at 18:39

## 2019-06-06 ASSESSMENT — ENCOUNTER SYMPTOMS
BRUISES/BLEEDS EASILY: 0
CONFUSION: 0
WOUND: 0
HEMATURIA: 0
SHORTNESS OF BREATH: 0
CHILLS: 0
ABDOMINAL PAIN: 0
FEVER: 0
ADENOPATHY: 0
BACK PAIN: 0
CHEST TIGHTNESS: 1

## 2019-06-06 ASSESSMENT — MIFFLIN-ST. JEOR: SCORE: 1907.57

## 2019-06-06 NOTE — ED TRIAGE NOTES
"Patient to ER reporting anterior L chest pain that is a 1/10 at baseline and increases to 8/10 with inspiration. He states he has been experiencing this \"for a couple weeks\" but reports an increase in intensity and frequency today. Patient is bracing chest with hand.   "

## 2019-06-06 NOTE — LETTER
June 6, 2019      To Whom It May Concern:      Rene Domínguez was seen in our Emergency Department today, 06/06/19.  He will need tomorrow after work to recover from his illness but may return to work Monday, 6/10/2019.    Sincerely,                      Colin Ledezma PA-C

## 2019-06-06 NOTE — ED AVS SNAPSHOT
[facility]  [address]  [place] [address]  Phone:  [phone]  Fax:  [phone]                                    Rene Domínguez   MRN: [id number]    Department:  [facility]   Date of Visit:  6/6/2019           After Visit Summary Signature Page    I have received my discharge instructions, and my questions have been answered. I have discussed any challenges I see with this plan with the nurse or doctor.    ..........................................................................................................................................  Patient/Patient Representative Signature      ..........................................................................................................................................  Patient Representative Print Name and Relationship to Patient    ..................................................               ................................................  Date                                   Time    ..........................................................................................................................................  Reviewed by Signature/Title    ...................................................              ..............................................  Date                                               Time          22EPIC Rev 08/18

## 2019-06-06 NOTE — ED PROVIDER NOTES
"  History     Chief Complaint   Patient presents with     Pleurisy     \"for weeks\" pain increases with inspiration, L anterior chest     This is a 19-year-old male who has been having some gradual increasing left anterior chest pain over the past week or so.  He reports increased pain with deep inspiration.  At baseline rates his pain is 1/10 but with deep inspiration jumps up to 8/10.  Denies any fever or chills no nausea or vomiting.  Today he was at work and using a weed whip when he had increased severity of his pain is here for further evaluation at this time.  Denies any other issues.  Denies any injury.  Denies any previous chest issues.  He drove here today for further evaluation            Allergies:  Allergies   Allergen Reactions     Amoxicillin Rash and Hives     As a child       Problem List:    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     Priority: Medium     Vitiligo 04/08/2010     Priority: Medium        Past Medical History:    Past Medical History:   Diagnosis Date     Concussion with loss of consciousness      Other injury of unspecified body region      Other specified epidermal thickening      Undescended testicle      Vitiligo        Past Surgical History:    Past Surgical History:   Procedure Laterality Date     ARTHROSCOPIC RECONSTRUCTION ANTERIOR CRUCIATE LIGAMENT BONE TENDON BONE AUTOGRAFT Left " "1/15/2019    Procedure: Examination Under Anesthesia, Left Knee Anterior Cruciate Ligament Reconstruction, Bone Tendon Bone Autograft;  Surgeon: Sunny Guillen MD;  [facility]: [facility]     ARTHROSCOPY KNEE WITH MENISCAL REPAIR Left 1/15/2019    Procedure: Lateral Meniscus Repair;  Surgeon: Sunny Guillen MD;  Location: [facility] OR     HERNIORRHAPHY UMBILICAL N/A 11/6/2018    Procedure: Umbilical Exploration, Excision of Urachal Cyst;  Surgeon: Rj Romero MD;  Location:  OR     testicular surgery  2012    [facility]     wisdom teeth         Family History:    Family History   Problem Relation Age of Onset     Hypertension Father         Hypertension     Hypertension Maternal Grandfather         Hypertension     Prostate Cancer Maternal Grandfather         Cancer-prostate     Thyroid Disease Maternal Grandmother         Thyroid Disease       Social History:  Marital Status:  Single [1]  Social History     Tobacco Use     Smoking status: Never Smoker     Smokeless tobacco: Never Used   Substance Use Topics     Alcohol use: No     Drug use: No        Medications:      acetaminophen (TYLENOL) 325 MG tablet   aluminum chloride (DRYSOL) 20 % external solution         Review of Systems   Constitutional: Negative for chills and fever.   HENT: Negative for congestion.    Eyes: Negative for visual disturbance.   Respiratory: Positive for chest tightness. Negative for shortness of breath.    Cardiovascular: Positive for chest pain.   Gastrointestinal: Negative for abdominal pain.   Genitourinary: Negative for hematuria.   Musculoskeletal: Negative for back pain.   Skin: Negative for rash and wound.   Neurological: Negative for syncope.   Hematological: Negative for adenopathy. Does not bruise/bleed easily.   Psychiatric/Behavioral: Negative for confusion.       Physical Exam   BP: 123/69  Heart Rate: 84  Temp: 97.3  F (36.3  C)  Resp: 18  Height: 180.3 cm (5' 11\")  Weight: 87.5 kg (193 " lb)  SpO2: 98 %      Physical Exam   Constitutional: He is oriented to person, place, and time. He appears well-developed and well-nourished. No distress.   HENT:   Head: Normocephalic and atraumatic.   Eyes: Conjunctivae are normal. No scleral icterus.   Neck: Neck supple.   Cardiovascular: Normal rate and regular rhythm.   Pulmonary/Chest: Effort normal.   Lung sounds clear.  SaO2 is 90% on room air.  Patient is noted clutching his left anterior chest with deep inspiration.   Abdominal: Soft. There is no tenderness.   Musculoskeletal: Normal range of motion. He exhibits no deformity.   Lymphadenopathy:     He has no cervical adenopathy.   Neurological: He is alert and oriented to person, place, and time.   Skin: Skin is warm and dry. Capillary refill takes less than 2 seconds. No rash noted. He is not diaphoretic.   Psychiatric: He has a normal mood and affect.       ED Course     ED Course as of Jun 06 2112   Thu Jun 06, 2019   1933 Chloride: 100   2112 MCH: 31.5     Procedures          EKG shows normal sinus rhythm.  Rightward axis.  Heart rate is 62.  No previous EKG for comparison             Results for orders placed or performed during the hospital encounter of 06/06/19 (from the past 24 hour(s))   CBC with platelets differential   Result Value Ref Range    WBC 11.0 4.0 - 11.0 10e9/L    RBC Count 5.33 4.4 - 5.9 10e12/L    Hemoglobin 16.8 13.3 - 17.7 g/dL    Hematocrit 48.5 40.0 - 53.0 %    MCV 91 78 - 100 fl    MCH 31.5 26.5 - 33.0 pg    MCHC 34.6 31.5 - 36.5 g/dL    RDW 12.7 10.0 - 15.0 %    Platelet Count 275 150 - 450 10e9/L    Diff Method Automated Method     % Neutrophils 78.6 %    % Lymphocytes 14.5 %    % Monocytes 5.0 %    % Eosinophils 1.1 %    % Basophils 0.3 %    % Immature Granulocytes 0.5 %    Absolute Neutrophil 8.7 (H) 1.6 - 8.3 10e9/L    Absolute Lymphocytes 1.6 0.8 - 5.3 10e9/L    Absolute Monocytes 0.6 0.0 - 1.3 10e9/L    Absolute Eosinophils 0.1 0.0 - 0.7 10e9/L    Absolute Basophils 0.0  0.0 - 0.2 10e9/L    Abs Immature Granulocytes 0.1 0 - 0.4 10e9/L   INR   Result Value Ref Range    INR 1.15 0 - 1.3   Comprehensive metabolic panel   Result Value Ref Range    Sodium 137 134 - 144 mmol/L    Potassium 3.9 3.5 - 5.1 mmol/L    Chloride 100 98 - 107 mmol/L    Carbon Dioxide 28 21 - 31 mmol/L    Anion Gap 9 3 - 14 mmol/L    Glucose 85 70 - 105 mg/dL    Urea Nitrogen 16 7 - 25 mg/dL    Creatinine 0.86 0.70 - 1.30 mg/dL    GFR Estimate Not Calculated >60 mL/min/[1.73_m2]    GFR Estimate If Black Not Calculated >60 mL/min/[1.73_m2]    Calcium 10.0 8.6 - 10.3 mg/dL    Bilirubin Total 1.0 0.3 - 1.0 mg/dL    Albumin 4.6 3.5 - 5.7 g/dL    Protein Total 7.8 6.4 - 8.9 g/dL    Alkaline Phosphatase 57 34 - 104 U/L    ALT 12 7 - 52 U/L    AST 14 13 - 39 U/L   Troponin I   Result Value Ref Range    Troponin I ES <0.030 0.000 - 0.034 ug/L   D-Dimer ([facility])   Result Value Ref Range    D-Dimer ng/mL <200 0 - 230 ng/ml D-DU   Nt probnp inpatient (BNP)   Result Value Ref Range    N-Terminal Pro BNP Inpatient 10 0 - 100 pg/mL   XR Chest 2 Views    Narrative    PROCEDURE:  XR CHEST 2 VW    HISTORY:  pleuritic chest pain.     COMPARISON:  None.    FINDINGS:   The cardiac silhouette is normal in size. The pulmonary vasculature is  normal.  There is a rounded area of increased density in the left  upper lobe most likely a round pneumonia. Careful follow-up to  resolution however is recommended. The right lung is clear. No pleural  effusion or pneumothorax.      Impression    IMPRESSION:  Rounded area of abnormal increase in density in the left  upper lobe most likely a round pneumonia      KAYLEIGH RODGERS MD   CT Chest w Contrast    Narrative    PROCEDURE: CT CHEST W CONTRAST 6/6/2019 8:31 PM    HISTORY: RAMIREZ mass    COMPARISONS: None.    Meds/Dose Given: 100 mL omnipaque 350    TECHNIQUE: CT scan of the chest without IV contrast sagittal coronal  reconstructions were obtained    FINDINGS: Is abnormal area of increased density  in the left upper lobe  with air bronchograms the superior most consistent with pneumonia.  Careful follow-up to clearing is recommended. There are no hilar or  mediastinal masses lymphadenopathy. Heart size is normal. Some  residual thymic tissue is seen. Axillary and supraclavicular lymph  nodes appear normal. No pleural abnormalities are noted. The upper  portion of the liver spleen pancreas and adrenal glands appear normal.  Regional skeleton is intact.         Impression    IMPRESSION: Rounded area of increased density in the left upper lobe  with air bronchograms. This would be most consistent with pneumonia.  Careful follow-up to clearing is recommended.    KAYLEIGH RODGERS MD       Medications   ketorolac (TORADOL) injection 60 mg (60 mg Intramuscular Given 6/6/19 1839)   hydrOXYzine (ATARAX) tablet 50 mg (50 mg Oral Given 6/6/19 1856)   iohexol (OMNIPAQUE) 350 mg/mL solution 100 mL (100 mLs Intravenous Given 6/6/19 2028)   levofloxacin (LEVAQUIN) tablet 750 mg (750 mg Oral Given 6/6/19 2104)       Assessments & Plan (with Medical Decision Making)     I have reviewed the nursing notes.    I have reviewed the findings, diagnosis, plan and need for follow up with the patient.         Medication List      Started    ibuprofen 800 MG tablet  Commonly known as:  ADVIL/MOTRIN  800 mg, Oral, EVERY 8 HOURS PRN     levofloxacin 750 MG tablet  Commonly known as:  LEVAQUIN  750 mg, Oral, DAILY            Final diagnoses:   Pleuritic chest pain   Left upper lobe pneumonia (H)     Afebrile.  Vital signs stable.  Will consult history of increasing left upper chest wall pain pleuritic in nature.  Worsening with deep inspiration.  EKG shows normal sinus rhythm.  Rightward axis.  Heart rate is 62.  No previous EKG for comparison.  He was given Toradol and Atarax initially.  Troponin is normal.  D-dimer is normal.  BNP is normal.  INR is normal.  CBC shows normal white blood cells and no left shift, but some slightly elevated  neutrophils.  CMP is unremarkable.  Chest x-ray shows   Rounded area of abnormal increase in density in the left  upper lobe most likely a round pneumonia.  Given the formation of this density CT chest with IV contrast was performed and shows Rounded area of increased density in the left upper lobe  with air bronchograms. This would be most consistent with pneumonia.  Careful follow-up to clearing is recommended.  I discussed close follow-up and he will arrange this.  He was given Levaquin orally in the ER.  He reports his pain is much improved with the Toradol and Atarax.  I discussed Motrin for long-term pain relief.  Work note was written.  Rx for Levaquin and Motrin.  Follow-up if there is any concerns for further evaluation as needed.  Follow-up sooner if there is any other concerns problems or questions     6/6/2019   [facility] PA-C  06/06/19 [id number]

## 2019-06-12 ENCOUNTER — HOSPITAL ENCOUNTER (OUTPATIENT)
Dept: PHYSICAL THERAPY | Facility: OTHER | Age: 19
Setting detail: THERAPIES SERIES
End: 2019-06-12
Attending: ORTHOPAEDIC SURGERY
Payer: COMMERCIAL

## 2019-06-12 PROCEDURE — 97110 THERAPEUTIC EXERCISES: CPT | Mod: GP | Performed by: PHYSICAL THERAPIST

## 2019-07-09 ENCOUNTER — HOSPITAL ENCOUNTER (OUTPATIENT)
Dept: GENERAL RADIOLOGY | Facility: OTHER | Age: 19
Discharge: HOME | End: 2019-07-09
Attending: NURSE PRACTITIONER | Admitting: NURSE PRACTITIONER
Payer: COMMERCIAL

## 2019-07-09 ENCOUNTER — OFFICE VISIT (OUTPATIENT)
Dept: FAMILY MEDICINE | Facility: OTHER | Age: 19
End: 2019-07-09
Attending: NURSE PRACTITIONER
Payer: COMMERCIAL

## 2019-07-09 VITALS
DIASTOLIC BLOOD PRESSURE: 56 MMHG | BODY MASS INDEX: 27.94 KG/M2 | RESPIRATION RATE: 16 BRPM | TEMPERATURE: 98.2 F | HEART RATE: 78 BPM | WEIGHT: 199.6 LBS | HEIGHT: 71 IN | OXYGEN SATURATION: 97 % | SYSTOLIC BLOOD PRESSURE: 106 MMHG

## 2019-07-09 DIAGNOSIS — S91.311A LACERATION OF RIGHT FOOT, INITIAL ENCOUNTER: Primary | ICD-10-CM

## 2019-07-09 PROCEDURE — 73630 X-RAY EXAM OF FOOT: CPT | Mod: TC,RT

## 2019-07-09 PROCEDURE — 90715 TDAP VACCINE 7 YRS/> IM: CPT | Performed by: NURSE PRACTITIONER

## 2019-07-09 PROCEDURE — 99214 OFFICE O/P EST MOD 30 MIN: CPT | Mod: 25 | Performed by: NURSE PRACTITIONER

## 2019-07-09 PROCEDURE — 90471 IMMUNIZATION ADMIN: CPT | Performed by: NURSE PRACTITIONER

## 2019-07-09 ASSESSMENT — PAIN SCALES - GENERAL: PAINLEVEL: SEVERE PAIN (7)

## 2019-07-09 ASSESSMENT — MIFFLIN-ST. JEOR: SCORE: 1937.51

## 2019-07-09 NOTE — PROGRESS NOTES
[facility] ordered by Philomena Bautista NP.  [facility]   Lot # [id number]  Exp. 09/22/2019  NDC [id number]  Patient tolerated well.  Isidra Fontanez LPN..................7/9/2019  5:33 PM

## 2019-07-09 NOTE — PROGRESS NOTES
"  SUBJECTIVE:   Rene Domínguez is a 19 year old male who presents to clinic today for the following health issues:    HPI  Patient presents with concerns of glass in his right foot.  Was jumping off a gay smash walk yesterday when he stepped on a large broken bottle.  He believes he got most of it out but at work today had persistent pain with walking.  Has soaked it and applied alcohol and covered with a bandage.    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     Priority: Medium     Vitiligo 04/08/2010     Priority: Medium     Past Medical History:   Diagnosis Date     Concussion with loss of consciousness     x1 reports (\"being out for 3 min\".)     Other injury of unspecified body region     fx of forefoot     Other specified epidermal thickening     No Comments Provided     Undescended testicle     No Comments Provided     Vitiligo     has had derm consult      Past Surgical History:   Procedure Laterality Date     ARTHROSCOPIC RECONSTRUCTION ANTERIOR CRUCIATE LIGAMENT BONE TENDON BONE AUTOGRAFT Left 1/15/2019    Procedure: Examination Under Anesthesia, Left Knee Anterior Cruciate Ligament Reconstruction, Bone Tendon Bone Autograft;  Surgeon: Sunny Guillen MD;  [facility]     ARTHROSCOPY KNEE WITH MENISCAL REPAIR Left 1/15/2019    Procedure: Lateral Meniscus Repair;  Surgeon: Sunny Guillen, " "MD;  [facility]     HERNIORRHAPHY UMBILICAL N/A 11/6/2018    Procedure: Umbilical Exploration, Excision of Urachal Cyst;  Surgeon: Rj Romero MD;  Location: [facility] OR     testicular surgery  2012    [facility]     wisdom teeth       Family History   Problem Relation Age of Onset     Hypertension Father         Hypertension     Hypertension Maternal Grandfather         Hypertension     Prostate Cancer Maternal Grandfather         Cancer-prostate     Thyroid Disease Maternal Grandmother         Thyroid Disease     Social History     Tobacco Use     Smoking status: Never Smoker     Smokeless tobacco: Never Used   Substance Use Topics     Alcohol use: No     Social History     Social History Narrative    Graduated from [employer] 2017, going to [employer] in the fall.   Lives with mother and stepfather.     Current Outpatient Medications   Medication Sig Dispense Refill     acetaminophen (TYLENOL) 325 MG tablet Take 2 tablets (650 mg) by mouth every 4 hours 120 tablet 0     aluminum chloride (DRYSOL) 20 % external solution Apply topically At Bedtime 60 mL 11     Allergies   Allergen Reactions     Amoxicillin Rash and Hives     As a child       Review of Systems   Constitutional: Negative.    Musculoskeletal: Negative for arthralgias and joint swelling.   Skin: Positive for wound.   Neurological: Negative for weakness.        OBJECTIVE:     /56 (BP Location: Right arm, Patient Position: Sitting, Cuff Size: Adult Large)   Pulse 78   Temp 98.2  F (36.8  C) (Tympanic)   Resp 16   Ht 1.803 m (5' 11\")   Wt 90.5 kg (199 lb 9.6 oz)   SpO2 97%   BMI 27.84 kg/m    Body mass index is 27.84 kg/m .  Physical Exam   Constitutional: He is oriented to person, place, and time. He appears well-developed and well-nourished. No distress.   Cardiovascular: Normal rate.   Pulmonary/Chest: Effort normal.   Musculoskeletal: Normal range of motion.   Neurological: He is alert and oriented to person, place, and time. "   Skin: Skin is warm and dry. He is not diaphoretic.   3 cm superficial lacerations to plantar aspect of right foot. No obvious FB visualized.   Nursing note and vitals reviewed.      Diagnostic Test Results:  Results for orders placed or performed in visit on 07/09/19 (from the past 24 hour(s))   XR Foot Right G/E 3 Views    Narrative    EXAM:    XR Right Foot Complete     EXAM DATE/TIME:    7/9/2019 4:58 PM     CLINICAL HISTORY:    19 years old, male; Laceration without foreign body, right foot, initial   encounter; Pain; Additional info: Possible foriegn body     TECHNIQUE:    Imaging protocol: XR Right foot.    Views: 3 or more views.     COMPARISON:    No relevant prior studies available.     FINDINGS:    Bones/joints: There is  healing fracture of fifth metatarsal. Alignment is   satisfactory. No acute fracture or dislocation noted.    Soft tissues: Normal.       Impression    IMPRESSION:   Healing fracture of fifth metatarsal.     THIS DOCUMENT HAS BEEN ELECTRONICALLY SIGNED BY RACHEL SANFORD MD       ASSESSMENT/PLAN:       ICD-10-CM    1. Laceration of right foot, initial encounter S91.311A XR Foot Right G/E 3 Views        PLAN:  Wound is clean and dry. No FB visualized or with palpation, not seen on XR.   Reviewed healing fracture. Pt denies injury and pain.   Discussed wound care at home. Tdap is updated today.   Advised to monitor and f/u PRN.   Given Epic educational materials.     I explained my diagnostic considerations and recommendations to pt who voiced understanding and agreement with the treatment plan. All questions were answered. We discussed potential side effects of any prescribed or recommended therapies, as well as expectations for response to treatments.    Disclaimer:  This note consists of words and symbols derived from keyboarding, dictation, or using voice recognition software. As a result, there may be errors in the script that have gone undetected. Please consider this when  interpreting information found in this note.      JER Dozier, NP-C  7/9/2019 at 4:47 PM  [facility]

## 2019-07-09 NOTE — NURSING NOTE
Patient in clinic for foot pain after landing on glass yesterday while gay jumping. Wants to be sure nothing is in cut.  Tx with hydrogen peroxide.    Martha Wing LPN....................  7/9/2019   3:55 PM    Chief Complaint   Patient presents with     Musculoskeletal Problem       Medication Reconciliation: complete    Martha Wing LPN

## 2019-07-11 ASSESSMENT — ENCOUNTER SYMPTOMS
WOUND: 1
CONSTITUTIONAL NEGATIVE: 1
WEAKNESS: 0
JOINT SWELLING: 0
ARTHRALGIAS: 0

## (undated) DEVICE — GLOVE PROTEXIS POWDER FREE SMT 7.5  2D72PT75X

## (undated) DEVICE — PACK MAJOR LAPAROTOMY LF SBA15MLFCA

## (undated) DEVICE — TUBING SYSTEM ARTHREX PATIENT REDEUCE AR-6421

## (undated) DEVICE — BLADE KNIFE SURG 15 371115

## (undated) DEVICE — LINEN ORTHO PACK 5446

## (undated) DEVICE — BLADE SAW OSCILLATING STRK MICRO 9X10X0.51MM 2296-033-220

## (undated) DEVICE — SU VICRYL 3-0 CT-3 27" J327H

## (undated) DEVICE — DRSG STERI STRIP 1/2X4" R1547

## (undated) DEVICE — SOL NACL 0.9% IRRIG 3000ML BAG 2B7477

## (undated) DEVICE — PREP DURAPREP REMOVER 4OZ 8611

## (undated) DEVICE — BNDG SPANDAGRIP SZ J LF BEIGE 6.75" SAG13117

## (undated) DEVICE — SU MONOCRYL 4-0 PC-3 18" UND Y845G

## (undated) DEVICE — GLOVE PROTEXIS POWDER FREE SMT 8.0  2D72PT80X

## (undated) DEVICE — SPONGE LAP 18X18" X8435

## (undated) DEVICE — SUCTION MANIFOLD NEPTUNE 2 SYS 4 PORT 0702-020-000

## (undated) DEVICE — ESU PENCIL SMOKE EVAC W/ROCKER SWITCH 0703-047-000

## (undated) DEVICE — Device

## (undated) DEVICE — SU MONOCRYL 3-0 PS-1 27" Y936H

## (undated) DEVICE — SU FIBERWIRE 2 38"  AR-7200

## (undated) DEVICE — SOL WATER 1500ML

## (undated) DEVICE — SLEEVE COMPRESSION SCD KNEE MED 74022

## (undated) DEVICE — PIN GUIDE ARTHREX 2.4MM DRILL  AR-1250L

## (undated) DEVICE — PIN GUIDE ARTHREX 2.4MM W/EYE BEATH PIN AR-1297L

## (undated) DEVICE — ESU GROUND PAD ADULT W/CORD E7507

## (undated) DEVICE — LIGHT HANDLES PLASTIC

## (undated) DEVICE — REAMER ARTHREX LOW PROFILE 10MM  AR-1410LP

## (undated) DEVICE — PACK ARTHROSCOPY CUSTOM ASC

## (undated) DEVICE — PREP CHLORAPREP CLEAR 26ML APPLICATOR 260800

## (undated) DEVICE — PACK ACL SUPPLEMENT CUSTOM ASC

## (undated) DEVICE — SOL NACL 0.9% IRRIG 1000ML BOTTLE 2F7124

## (undated) DEVICE — DRAPE TIBURON TOP SHEET 100X60" 29352

## (undated) DEVICE — BUR ARTHREX COOLCUT SABRE 3.8MMX13CM AR-8380SR

## (undated) DEVICE — GLOVE PROTEXIS BLUE W/NEU-THERA 8.0  2D73EB80

## (undated) DEVICE — SUTURE 2-0 VICRYL TIES J911T

## (undated) DEVICE — PREP DURAPREP 26ML APL 8630

## (undated) DEVICE — SU LOOP #2 TIGERLOOP AR-7234T

## (undated) DEVICE — TUBING SUCTION MEDI-VAC 1/4"X20' N620A

## (undated) DEVICE — ABLATOR ARTHREX APOLLO RF MP90 ASPIRATING 90DEG AR-9811

## (undated) DEVICE — PEN MARKING SKIN W/PAPER RULER 31145785

## (undated) DEVICE — LINEN GOWN XLG 5407

## (undated) DEVICE — SU ETHIBOND 1 CT-1 30" X425H

## (undated) DEVICE — SU VICRYL 2-0 CT-1 27" UND J259H

## (undated) RX ORDER — PROPOFOL 10 MG/ML
INJECTION, EMULSION INTRAVENOUS
Status: DISPENSED
Start: 2018-11-06

## (undated) RX ORDER — FENTANYL CITRATE 50 UG/ML
INJECTION, SOLUTION INTRAMUSCULAR; INTRAVENOUS
Status: DISPENSED
Start: 2019-01-15

## (undated) RX ORDER — CLINDAMYCIN PHOSPHATE 600 MG/50ML
INJECTION, SOLUTION INTRAVENOUS
Status: DISPENSED
Start: 2019-01-15

## (undated) RX ORDER — LIDOCAINE HYDROCHLORIDE 10 MG/ML
INJECTION, SOLUTION EPIDURAL; INFILTRATION; INTRACAUDAL; PERINEURAL
Status: DISPENSED
Start: 2018-11-06

## (undated) RX ORDER — PROPOFOL 10 MG/ML
INJECTION, EMULSION INTRAVENOUS
Status: DISPENSED
Start: 2019-01-15

## (undated) RX ORDER — KETAMINE HYDROCHLORIDE 50 MG/ML
INJECTION, SOLUTION INTRAMUSCULAR; INTRAVENOUS
Status: DISPENSED
Start: 2018-11-06

## (undated) RX ORDER — FENTANYL CITRATE 50 UG/ML
INJECTION, SOLUTION INTRAMUSCULAR; INTRAVENOUS
Status: DISPENSED
Start: 2018-11-06

## (undated) RX ORDER — GABAPENTIN 300 MG/1
CAPSULE ORAL
Status: DISPENSED
Start: 2019-01-15

## (undated) RX ORDER — BUPIVACAINE HYDROCHLORIDE AND EPINEPHRINE 5; 5 MG/ML; UG/ML
INJECTION, SOLUTION EPIDURAL; INTRACAUDAL; PERINEURAL
Status: DISPENSED
Start: 2018-11-06

## (undated) RX ORDER — KETOROLAC TROMETHAMINE 30 MG/ML
INJECTION, SOLUTION INTRAMUSCULAR; INTRAVENOUS
Status: DISPENSED
Start: 2019-06-06

## (undated) RX ORDER — ACETAMINOPHEN 325 MG/1
TABLET ORAL
Status: DISPENSED
Start: 2019-01-15

## (undated) RX ORDER — ONDANSETRON 2 MG/ML
INJECTION INTRAMUSCULAR; INTRAVENOUS
Status: DISPENSED
Start: 2018-11-06

## (undated) RX ORDER — LIDOCAINE HYDROCHLORIDE 20 MG/ML
INJECTION, SOLUTION EPIDURAL; INFILTRATION; INTRACAUDAL; PERINEURAL
Status: DISPENSED
Start: 2018-11-06

## (undated) RX ORDER — ONDANSETRON 2 MG/ML
INJECTION INTRAMUSCULAR; INTRAVENOUS
Status: DISPENSED
Start: 2019-01-15

## (undated) RX ORDER — LEVOFLOXACIN 750 MG/1
TABLET, FILM COATED ORAL
Status: DISPENSED
Start: 2019-06-06

## (undated) RX ORDER — OXYCODONE HYDROCHLORIDE 5 MG/1
TABLET ORAL
Status: DISPENSED
Start: 2019-01-15

## (undated) RX ORDER — DEXAMETHASONE SODIUM PHOSPHATE 4 MG/ML
INJECTION, SOLUTION INTRA-ARTICULAR; INTRALESIONAL; INTRAMUSCULAR; INTRAVENOUS; SOFT TISSUE
Status: DISPENSED
Start: 2019-01-15

## (undated) RX ORDER — ACETAMINOPHEN 325 MG/1
TABLET ORAL
Status: DISPENSED
Start: 2018-11-06

## (undated) RX ORDER — DEXAMETHASONE SODIUM PHOSPHATE 4 MG/ML
INJECTION, SOLUTION INTRA-ARTICULAR; INTRALESIONAL; INTRAMUSCULAR; INTRAVENOUS; SOFT TISSUE
Status: DISPENSED
Start: 2018-11-06

## (undated) RX ORDER — KETOROLAC TROMETHAMINE 30 MG/ML
INJECTION, SOLUTION INTRAMUSCULAR; INTRAVENOUS
Status: DISPENSED
Start: 2018-11-06

## (undated) RX ORDER — HYDROMORPHONE HYDROCHLORIDE 1 MG/ML
INJECTION, SOLUTION INTRAMUSCULAR; INTRAVENOUS; SUBCUTANEOUS
Status: DISPENSED
Start: 2019-01-15

## (undated) RX ORDER — CLINDAMYCIN PHOSPHATE 900 MG/50ML
INJECTION, SOLUTION INTRAVENOUS
Status: DISPENSED
Start: 2018-11-06

## (undated) RX ORDER — KETOROLAC TROMETHAMINE 15 MG/ML
INJECTION, SOLUTION INTRAMUSCULAR; INTRAVENOUS
Status: DISPENSED
Start: 2018-12-26